# Patient Record
Sex: FEMALE | Race: WHITE | Employment: OTHER | URBAN - METROPOLITAN AREA
[De-identification: names, ages, dates, MRNs, and addresses within clinical notes are randomized per-mention and may not be internally consistent; named-entity substitution may affect disease eponyms.]

---

## 2018-04-05 ENCOUNTER — OFFICE VISIT (OUTPATIENT)
Dept: NEUROLOGY | Age: 63
End: 2018-04-05

## 2018-04-05 VITALS
DIASTOLIC BLOOD PRESSURE: 80 MMHG | SYSTOLIC BLOOD PRESSURE: 128 MMHG | BODY MASS INDEX: 27.23 KG/M2 | HEIGHT: 62 IN | OXYGEN SATURATION: 98 % | HEART RATE: 69 BPM | WEIGHT: 148 LBS

## 2018-04-05 DIAGNOSIS — E89.0 POSTABLATIVE HYPOTHYROIDISM: ICD-10-CM

## 2018-04-05 DIAGNOSIS — F32.1 CURRENT MODERATE EPISODE OF MAJOR DEPRESSIVE DISORDER WITHOUT PRIOR EPISODE (HCC): ICD-10-CM

## 2018-04-05 DIAGNOSIS — I10 ESSENTIAL HYPERTENSION: ICD-10-CM

## 2018-04-05 DIAGNOSIS — R41.3 MEMORY LOSS: Primary | ICD-10-CM

## 2018-04-05 DIAGNOSIS — R20.0 HAND NUMBNESS: ICD-10-CM

## 2018-04-05 RX ORDER — ESCITALOPRAM OXALATE 20 MG/1
20 TABLET ORAL DAILY
COMMUNITY
End: 2018-04-05 | Stop reason: ALTCHOICE

## 2018-04-05 RX ORDER — BISMUTH SUBSALICYLATE 262 MG
1 TABLET,CHEWABLE ORAL DAILY
COMMUNITY

## 2018-04-05 RX ORDER — OXYBUTYNIN CHLORIDE 15 MG/1
15 TABLET, EXTENDED RELEASE ORAL 2 TIMES DAILY
COMMUNITY
End: 2021-08-18

## 2018-04-05 RX ORDER — MELATONIN
2 TIMES DAILY
COMMUNITY

## 2018-04-05 RX ORDER — POTASSIUM CHLORIDE 20 MEQ/1
TABLET, EXTENDED RELEASE ORAL 2 TIMES DAILY
COMMUNITY

## 2018-04-05 RX ORDER — VENLAFAXINE HYDROCHLORIDE 37.5 MG/1
CAPSULE, EXTENDED RELEASE ORAL
Qty: 60 CAP | Refills: 5 | Status: SHIPPED | OUTPATIENT
Start: 2018-04-05 | End: 2018-06-08 | Stop reason: SDUPTHER

## 2018-04-05 NOTE — LETTER
4/5/2018 10:52 AM 
 
Patient:  Elisha Sullivan YOB: 1955 Date of Visit: 4/5/2018 Dear Scot Torres MD 
[facility] [address] [address] VIA Facsimile: [phone] 
 : Thank you for referring Ms. Elisha Sullivan to me for evaluation/treatment. Below are the relevant portions of my assessment and plan of care. HISTORY OF PRESENT ILLNESS Elisha Sullivan is a 58 y.o. female. HPI Comments: This patient is a 28-year-old right-handed  female who comes in today for memory problems. She is accompanied by a daughter. Her family has told her that she is having some memory problems. She states that her family tells her that she does not remember think she is told. She is employed full-time. She is doing reasonably well at her job. She has no problems driving to and from her job she has not had any complaints from the folks at her job about her performance. Her  is chronically ill and she believes that is considerable stressor for her. She has been treated for depression and is currently taking Lexapro 20 mg a day for the last 2 years. She does have a family history of depression. She also has hypertension, arthritis, and surgical hypothyroidism. She does have what she calls migraine headaches a few times a year and treats them with over-the-counter medications. Memory Loss The history is provided by the patient. This is a chronic problem. Review of Systems Constitutional:  
     Systems is positive for depression, fatigue, joint pain, memory loss, snoring. Complete review of systems done all others negative. Psychiatric/Behavioral: Positive for memory loss. Current Outpatient Prescriptions on File Prior to Visit Medication Sig Dispense Refill  indapamide (LOZOL) 2.5 mg tablet Take 2.5 mg by mouth daily.  levothyroxine (LEVOTHROID) 88 mcg tablet Take 88 mcg by mouth Daily (before breakfast).  diazepam (VALIUM) 5 mg tablet Take 1 Tab by mouth every eight (8) hours as needed (spasm). 15 Tab 0  
 traMADol (ULTRAM) 50 mg tablet Take 1 Tab by mouth every six (6) hours as needed for Pain. 20 Tab 0 No current facility-administered medications on file prior to visit. Past Medical History:  
Diagnosis Date  Arthritis  Endocrine disease  Headache  Hypertension  Memory disorder  Thyroid disease Family History Problem Relation Age of Onset  Cancer Mother  Dementia Mother  Migraines Mother  Kidney Disease Father Social History Substance Use Topics  Smoking status: Current Every Day Smoker Packs/day: 0.50  Smokeless tobacco: Never Used  Alcohol use No  
 
/80  Pulse 69  Ht 5' 2\" (1.575 m)  Wt 148 lb (67.1 kg)  SpO2 98%  BMI 27.07 kg/m2 Physical Exam  
MMSE 29/30 Constitutional: Oriented to person, place, and time, appears well-developed and well-nourished. No distress. HENT:  
Head: Normocephalic and atraumatic. Mouth/Throat: Oropharynx is clear and moist. No oropharyngeal exudate. Eyes: Conjunctivae and EOM are normal. Pupils are equal, round, and reactive to light. No scleral icterus. Neck: Normal range of motion. Neck supple. No thyromegaly present. Cardiovascular: Normal rate, regular rhythm and normal heart sounds. Musculoskeletal: Normal range of motion, exhibits no edema, tenderness or deformity. Lymphadenopathy: no cervical adenopathy. Neurological: Alert and oriented to person, place, and time. Normal strength and normal reflexes. Displays no atrophy and no tremor. No cranial nerve deficit or sensory deficit. Exhibits normal muscle tone. Displays a negative Romberg sign, no seizure activity. Coordination normal, gait normal.  
No Babinski's sign on the right side. No Babinski's sign on the left side.   
Speech, language and mentation are normal 
 Visual fields are full to confrontation, funduscopic exam reveals flat discs, the retina and vasculature are normal  
Skin: Skin is warm and dry. No rash noted, not diaphoretic. No erythema. Psychiatric: Normal mood and affect,  behavior is normal. Judgment and thought content normal.  
Vitals reviewed. ASSESSMENT and PLAN 
MEMORY LOSS I suspect this patient's memory loss is secondary to poorly controlled depression. She is working in a tough job and has a chronically ill . I suspect she has become resistant to the Lexapro she is taking. I am going to stop her Lexapro and put her on [facility] SR 7.5 mg a day for 2 weeks and then up to 75 mg a day. I have asked her to call us in 4 weeks and let us know how she is doing. I am going to set her up for an MRI scan of her brain out contrast.  I will rule out significant abnormalities that could cause memory loss. Again with an MMSE score of 29/30 I do not think were dealing with any disease besides depression here. Patient understands this and is agreeable with the treatment plan. HYPERTENSION Stroke risk, stable, managed by primary care DEPRESSION Not currently controlled, see above This note will not be viewable in [address] This note was created using voice recognition software. Despite editing, there may be syntax errors. If you have questions, please do not hesitate to call me. I look forward to following Ms. Janna Parker along with you. Sincerely, Ludin Pleitez MD

## 2018-04-05 NOTE — MR AVS SNAPSHOT
[facility] [address], 
[id number], [address] [facility] 
[phone] Patient: Kraig Santos MRN: [id number] VDH:4/1/0202 Visit Information Date & Time Provider Department Dept. Phone Encounter #  
 4/5/2018  9:00 AM Chuck Wild MD [facility] [phone] [id number] Your Appointments 10/4/2018  9:40 AM  
Follow Up with Chuck Wild MD  
Neurology [facility] at [facility] [address]) Appt Note: follow up memory loss $ 0 CP jll 4/5/18  
 [address] [place] [address], 
[address], [address] [address]  
[address] [facility], [address], [address] [address] Upcoming Health Maintenance Date Due Hepatitis C Screening 1955 Pneumococcal 19-64 Medium Risk (1 of 1 - PPSV23) 4/2/1973 DTaP/Tdap/Td series (1 - Tdap) 4/2/1975 PAP AKA CERVICAL CYTOLOGY 4/2/1975 BREAST CANCER SCRN MAMMOGRAM 4/2/2004 FOBT Q 1 YEAR AGE 50-75 4/2/2004 ZOSTER VACCINE AGE 60> 2/2/2014 Influenza Age 5 to Adult 8/1/2017 Allergies as of 4/5/2018  Review Complete On: 4/5/2018 By: Jack Bowling LPN Severity Noted Reaction Type Reactions Sulfa (Sulfonamide Antibiotics)  02/16/2014    Nausea Only Current Immunizations  Never Reviewed No immunizations on file. Not reviewed this visit You Were Diagnosed With   
  
 Codes Comments Memory loss    -  Primary ICD-10-CM: R41.3 ICD-9-CM: 780.93 Current moderate episode of major depressive disorder without prior episode ([facility] [address]     ICD-10-CM: F32.1 ICD-9-CM: 296.22 Essential hypertension     ICD-10-CM: I10 
ICD-9-CM: 401.9 Postablative hypothyroidism     ICD-10-CM: E89.0 ICD-9-CM: 244.1 Hand numbness     ICD-10-CM: R20.0 ICD-9-CM: 650. 0 Vitals BP Pulse Height(growth percentile) Weight(growth percentile) SpO2 BMI 128/80 69 5' 2\" (1.575 m) 148 lb (67.1 kg) 98% 27.07 kg/m2 Smoking Status Current Every Day Smoker Vitals History BMI and BSA Data Body Mass Index Body Surface Area  
 27.07 kg/m 2 1.71 m 2 Your Updated Medication List  
  
   
This list is accurate as of 18  9:42 AM.  Always use your most recent med list.  
  
  
  
  
 diazePAM 5 mg tablet Commonly known as:  VALIUM Take 1 Tab by mouth every eight (8) hours as needed (spasm). escitalopram oxalate 20 mg tablet Commonly known as:  Ayesha Angst Take 20 mg by mouth daily. indapamide 2.5 mg tablet Commonly known as:  Bañuelos Camron Take 2.5 mg by mouth daily. LEVOTHROID 88 mcg tablet Generic drug:  levothyroxine Take 88 mcg by mouth Daily (before breakfast). multivitamin tablet Commonly known as:  ONE A DAY Take 1 Tab by mouth daily. oxybutynin chloride XL 15 mg CR tablet Commonly known as:  DITROPAN XL Take 15 mg by mouth two (2) times a day. potassium chloride 20 mEq tablet Commonly known as:  K-DUR, KLOR-CON Take  by mouth two (2) times a day. traMADol 50 mg tablet Commonly known as:  ULTRAM  
Take 1 Tab by mouth every six (6) hours as needed for Pain. venlafaxine-SR 37.5 mg capsule Commonly known as:  EFFEXOR-XR  
1 p.o. nightly for 1 week then 2 p.o. nightly. Indications: major depressive disorder VITAMIN D3 1,000 unit tablet Generic drug:  cholecalciferol Take  by mouth two (2) times a day. Prescriptions Printed Refills  
 venlafaxine-SR (EFFEXOR-XR) 37.5 mg capsule 5 Si p.o. nightly for 1 week then 2 p.o. nightly. Indications: major depressive disorder Class: Print To-Do List   
 2018 Imaging:  MRI BRAIN WO CONT Patient Instructions PRESCRIPTION REFILL POLICY [facility] Statement to Patients 2014 In an effort to ensure the large volume of patient prescription refills is processed in the most efficient and expeditious manner, we are asking our patients to assist us by calling your Pharmacy for all prescription refills, this will include also your  Mail Order Pharmacy. The pharmacy will contact our office electronically to continue the refill process. Please do not wait until the last minute to call your pharmacy. We need at least 48 hours (2days) to fill prescriptions. We also encourage you to call your pharmacy before going to  your prescription to make sure it is ready. With regard to controlled substance prescription refill requests (narcotic refills) that need to be picked up at our office, we ask your cooperation by providing us with at least 72 hours (3days) notice that you will need a refill. We will not refill narcotic prescription refill requests after 4:00pm on any weekday, Monday through Thursday, or after 2:00pm on Fridays, or on the weekends. We encourage everyone to explore another way of getting your prescription refill request processed using [employer], our patient web portal through our electronic medical record system. [employer] is an efficient and effective way to communicate your medication request directly to the office and  downloadable as an abi on your smart phone . [employer] also features a review functionality that allows you to view your medication list as well as leave messages for your physician. Are you ready to get connected? If so please review the attatched instructions or speak to any of our staff to get you set up right away! Thank you so much for your cooperation. Should you have any questions please contact our Practice Administrator. The Physicians and Staff,  [facility] Introducing [facility]!    
 [facility] introduces [employer] patient portal. Now you can access parts of your medical record, email your doctor's office, and request medication refills online. 1. In your internet browser, go to [email or web address] 2. Click on the First Time User? Click Here link in the Sign In box. You will see the New Member Sign Up page. 3. Enter your [employer] Access Code exactly as it appears below. You will not need to use this code after youve completed the sign-up process. If you do not sign up before the expiration date, you must request a new code. · [employer] Access Code: [email or web address] Expires: 7/4/2018  8:33 AM 
 
4. Enter the last four digits of your Social Security Number (xxxx) and Date of Birth (mm/dd/yyyy) as indicated and click Submit. You will be taken to the next sign-up page. 5. Create a [employer] ID. This will be your [employer] login ID and cannot be changed, so think of one that is secure and easy to remember. 6. Create a [employer] password. You can change your password at any time. 7. Enter your Password Reset Question and Answer. This can be used at a later time if you forget your password. 8. Enter your e-mail address. You will receive e-mail notification when new information is available in [address] 9. Click Sign Up. You can now view and download portions of your medical record. 10. Click the Download Summary menu link to download a portable copy of your medical information. If you have questions, please visit the Frequently Asked Questions section of the [employer] website. Remember, [employer] is NOT to be used for urgent needs. For medical emergencies, dial 911. Now available from your iPhone and Android! Please provide this summary of care documentation to your next provider. Your primary care clinician is listed as Monika Biggs. If you have any questions after today's visit, please call [phone].

## 2018-04-05 NOTE — PROGRESS NOTES
HISTORY OF PRESENT ILLNESS  Megha Mittal is a 58 y.o. female. HPI Comments: This patient is a 59-year-old right-handed  female who comes in today for memory problems. She is accompanied by a daughter. Her family has told her that she is having some memory problems. She states that her family tells her that she does not remember think she is told. She is employed full-time. She is doing reasonably well at her job. She has no problems driving to and from her job she has not had any complaints from the folks at her job about her performance. Her  is chronically ill and she believes that is considerable stressor for her. She has been treated for depression and is currently taking Lexapro 20 mg a day for the last 2 years. She does have a family history of depression. She also has hypertension, arthritis, and surgical hypothyroidism. She does have what she calls migraine headaches a few times a year and treats them with over-the-counter medications. Memory Loss    The history is provided by the patient. This is a chronic problem. Review of Systems   Constitutional:        Systems is positive for depression, fatigue, joint pain, memory loss, snoring. Complete review of systems done all others negative. Psychiatric/Behavioral: Positive for memory loss. Current Outpatient Prescriptions on File Prior to Visit   Medication Sig Dispense Refill    indapamide (LOZOL) 2.5 mg tablet Take 2.5 mg by mouth daily.  levothyroxine (LEVOTHROID) 88 mcg tablet Take 88 mcg by mouth Daily (before breakfast).  diazepam (VALIUM) 5 mg tablet Take 1 Tab by mouth every eight (8) hours as needed (spasm). 15 Tab 0    traMADol (ULTRAM) 50 mg tablet Take 1 Tab by mouth every six (6) hours as needed for Pain. 20 Tab 0     No current facility-administered medications on file prior to visit.       Past Medical History:   Diagnosis Date    Arthritis     Endocrine disease     Headache     Hypertension     Memory disorder     Thyroid disease      Family History   Problem Relation Age of Onset    Cancer Mother     Dementia Mother     Migraines Mother     Kidney Disease Father      Social History   Substance Use Topics    Smoking status: Current Every Day Smoker     Packs/day: 0.50    Smokeless tobacco: Never Used    Alcohol use No     /80  Pulse 69  Ht 5' 2\" (1.575 m)  Wt 148 lb (67.1 kg)  SpO2 98%  BMI 27.07 kg/m2  Physical Exam   MMSE 29/30  Constitutional: Oriented to person, place, and time, appears well-developed and well-nourished. No distress. HENT:   Head: Normocephalic and atraumatic. Mouth/Throat: Oropharynx is clear and moist. No oropharyngeal exudate. Eyes: Conjunctivae and EOM are normal. Pupils are equal, round, and reactive to light. No scleral icterus. Neck: Normal range of motion. Neck supple. No thyromegaly present. Cardiovascular: Normal rate, regular rhythm and normal heart sounds. Musculoskeletal: Normal range of motion, exhibits no edema, tenderness or deformity. Lymphadenopathy: no cervical adenopathy. Neurological: Alert and oriented to person, place, and time. Normal strength and normal reflexes. Displays no atrophy and no tremor. No cranial nerve deficit or sensory deficit. Exhibits normal muscle tone. Displays a negative Romberg sign, no seizure activity. Coordination normal, gait normal.   No Babinski's sign on the right side. No Babinski's sign on the left side. Speech, language and mentation are normal  Visual fields are full to confrontation, funduscopic exam reveals flat discs, the retina and vasculature are normal   Skin: Skin is warm and dry. No rash noted, not diaphoretic. No erythema. Psychiatric: Normal mood and affect,  behavior is normal. Judgment and thought content normal.   Vitals reviewed. ASSESSMENT and PLAN  MEMORY LOSS  I suspect this patient's memory loss is secondary to poorly controlled depression. She is working in a tough job and has a chronically ill . I suspect she has become resistant to the Lexapro she is taking. I am going to stop her Lexapro and put her on [facility] SR 7.5 mg a day for 2 weeks and then up to 75 mg a day. I have asked her to call us in 4 weeks and let us know how she is doing. I am going to set her up for an MRI scan of her brain out contrast.  I will rule out significant abnormalities that could cause memory loss. Again with an MMSE score of 29/30 I do not think were dealing with any disease besides depression here. Patient understands this and is agreeable with the treatment plan. HYPERTENSION  Stroke risk, stable, managed by primary care  DEPRESSION  Not currently controlled, see above  This note will not be viewable in [employer]. This note was created using voice recognition software. Despite editing, there may be syntax errors.

## 2018-04-17 ENCOUNTER — TELEPHONE (OUTPATIENT)
Dept: NEUROLOGY | Age: 63
End: 2018-04-17

## 2018-04-17 DIAGNOSIS — R41.3 MEMORY LOSS: Primary | ICD-10-CM

## 2018-04-17 DIAGNOSIS — F32.A DEPRESSION, UNSPECIFIED DEPRESSION TYPE: ICD-10-CM

## 2018-04-17 NOTE — TELEPHONE ENCOUNTER
Sara All  Female, 58 y.o., 1955  Weight:   148 lb (67.1 kg)  Phone:   L:[phone]  PCP:   Martin Boggs MD  MRN:   [id number]  MyChart:   Pending  Next Appt (With Me)  None  Next Appt (Any Provider)  10/04/2018     Denial  Received: Today       Daryl Mujica LPN       Phone Number: [phone]                     Luis Olson- I wanted to let you know that Ms. Rosenbaum's MRI of the brain has been denied. Sim Pittman will be contacting the patient this morning to let them know that the appointment will be canceled. Here is the information from the insurance co. on the reason for denial.     Authorization Denied // [facility]#[id number] FOR CPT 38120 Based on [facility]   Imaging Guidelines, we are unable to approve the requested study. Guidelines   support advanced imaging after there has been a clinical diagnosis of dementia. A diagnosis can be made by a detailed history documenting memory loss and   cognitive impairment affecting daily activities, and/or an abnormal mental   status exam such as the Mini Mental Status Exam, Demarcus Cognitive Assessment,   Memory Impairment Screen, or other neuropsychological testing.  The clinical   information provided does not meet these criteria and, therefore, advanced   imaging is not indicated at this time.     Magda Bell

## 2018-05-24 ENCOUNTER — OFFICE VISIT (OUTPATIENT)
Dept: NEUROLOGY | Age: 63
End: 2018-05-24

## 2018-05-24 VITALS
HEIGHT: 62 IN | DIASTOLIC BLOOD PRESSURE: 70 MMHG | OXYGEN SATURATION: 98 % | BODY MASS INDEX: 26.31 KG/M2 | WEIGHT: 143 LBS | HEART RATE: 76 BPM | SYSTOLIC BLOOD PRESSURE: 128 MMHG

## 2018-05-24 DIAGNOSIS — R41.3 MEMORY LOSS: ICD-10-CM

## 2018-05-24 DIAGNOSIS — R29.2 HYPERREFLEXIA: Primary | ICD-10-CM

## 2018-05-24 DIAGNOSIS — R55 SYNCOPE, UNSPECIFIED SYNCOPE TYPE: ICD-10-CM

## 2018-05-24 DIAGNOSIS — F68.8 PERSONALITY CHANGE: ICD-10-CM
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
## 2018-05-24 NOTE — PATIENT INSTRUCTIONS
Office Policies  o Phone calls/patient messages:  Please allow up to 24 hours for someone in the office to contact you about your call or message. Be mindful your provider may be out of the office or your message may require further review. We encourage you to use [employer] for your messages as this is a faster, more efficient way to communicate with our office  o Medication Refills:  Prescription medications require up to 48 business hours to process. We encourage you to use [employer] for your refills. For controlled medications: Please allow up to 72 business hours to process. Certain medications may require you to  a written prescription at our office. NO narcotic/controlled medications will be prescribed after 4pm Monday through Friday or on weekends  o Form/Paperwork Completion:  Please note there is a $25 fee for all paperwork completed by our providers. We ask that you allow 7-14 business days. Pre-payment is due prior to picking up/faxing the completed form. You may also download your forms to [employer] to have your doctor print off.  o Test Results: In order for a patient to obtain test results, an appointment must be made with the physician to review the results. Test results cannot be discussed over the phone.

## 2018-05-25 ENCOUNTER — DOCUMENTATION ONLY (OUTPATIENT)
Dept: NEUROLOGY | Age: 63
End: 2018-05-25

## 2018-06-07 ENCOUNTER — HOSPITAL ENCOUNTER (OUTPATIENT)
Dept: MRI IMAGING | Age: 63
Discharge: HOME | End: 2018-06-07
Attending: PSYCHIATRY & NEUROLOGY
Payer: COMMERCIAL

## 2018-06-07 DIAGNOSIS — F68.8 PERSONALITY CHANGE: ICD-10-CM

## 2018-06-07 DIAGNOSIS — R29.2 HYPERREFLEXIA: ICD-10-CM

## 2018-06-07 PROCEDURE — 72141 MRI NECK SPINE W/O DYE: CPT

## 2018-06-07 PROCEDURE — 70551 MRI BRAIN STEM W/O DYE: CPT

## 2018-06-08 ENCOUNTER — HOSPITAL ENCOUNTER (OUTPATIENT)
Dept: NEUROLOGY | Age: 63
Discharge: HOME | End: 2018-06-08
Attending: PSYCHIATRY & NEUROLOGY

## 2018-06-08 DIAGNOSIS — F68.8 PERSONALITY CHANGE: ICD-10-CM

## 2018-06-08 DIAGNOSIS — R29.2 HYPERREFLEXIA: ICD-10-CM

## 2018-06-08 DIAGNOSIS — R55 SYNCOPE, UNSPECIFIED SYNCOPE TYPE: ICD-10-CM

## 2018-06-08 RX ORDER — VENLAFAXINE HYDROCHLORIDE 37.5 MG/1
CAPSULE, EXTENDED RELEASE ORAL
Qty: 180 CAP | Refills: 3 | Status: SHIPPED | OUTPATIENT
Start: 2018-06-08 | End: 2019-12-19 | Stop reason: CLARIF

## 2018-06-08 NOTE — PROCEDURES
Patient Name: Va Christianson  : 1955  Age: 58 y.o. Ordering physician: Zulema Salmon  Date of EE18   EEG procedure number: [id number]  Diagnosis: Memory loss  Interpreting physician: Gen Cleveland MD      ELECTROENCEPHALOGRAM REPORT     PROCEDURE: EEG. CLINICAL INDICATION: The patient is a 58 y.o. female who is being evaluated for baseline electro cerebral activities and to rule out seizure focus. EEG CLASSIFICATION: Normal    DESCRIPTION OF THE RECORD:   The background of this recording contains a posteriorly-located occipital alpha rhythm of 10 Hz that attenuates with eye opening. Throughout the recording, there were no clear areas of focal slowing nor spike or spike-and-wave discharges seen. Hyperventilation was not performed. Photic stimulation produced no response in the posterior head regions. During the recording the patient did not achieve stage II sleep    INTERPRETATION: This is a normal electroencephalogram with the patient awake, showing no clear focal abnormalities or epileptiform activity. A normal EEG doesn't not rule out seizures. Clinical correlation recommended.       Gen Cleveland MD  Neurologist

## 2018-06-08 NOTE — TELEPHONE ENCOUNTER
Received refill for 90 day supply from [facility] for   Last filled 4/5/18  Dr. Lilia Waddell refill

## 2018-06-19 ENCOUNTER — TELEPHONE (OUTPATIENT)
Dept: NEUROLOGY | Age: 63
End: 2018-06-19

## 2018-06-20 ENCOUNTER — DOCUMENTATION ONLY (OUTPATIENT)
Dept: NEUROLOGY | Age: 63
End: 2018-06-20

## 2018-06-26 ENCOUNTER — OFFICE VISIT (OUTPATIENT)
Dept: CARDIOLOGY CLINIC | Age: 63
End: 2018-06-26

## 2018-06-26 VITALS
BODY MASS INDEX: 25.27 KG/M2 | OXYGEN SATURATION: 97 % | HEIGHT: 62 IN | WEIGHT: 137.3 LBS | RESPIRATION RATE: 16 BRPM | DIASTOLIC BLOOD PRESSURE: 70 MMHG | HEART RATE: 70 BPM | SYSTOLIC BLOOD PRESSURE: 120 MMHG

## 2018-06-26 DIAGNOSIS — R55 SYNCOPE AND COLLAPSE: Primary | ICD-10-CM

## 2018-06-26 DIAGNOSIS — Z76.89 ENCOUNTER TO ESTABLISH CARE: ICD-10-CM

## 2018-06-26 DIAGNOSIS — E03.9 HYPOTHYROIDISM, UNSPECIFIED TYPE: ICD-10-CM

## 2018-06-26 DIAGNOSIS — I10 ESSENTIAL HYPERTENSION: ICD-10-CM

## 2018-06-26 DIAGNOSIS — Z82.49 FAMILY HISTORY OF ISCHEMIC HEART DISEASE AND OTHER DISEASES OF THE CIRCULATORY SYSTEM: ICD-10-CM

## 2018-06-26 RX ORDER — ZINC GLUCONATE 50 MG
1000 TABLET ORAL DAILY
COMMUNITY
End: 2022-08-11

## 2018-06-26 RX ORDER — ESCITALOPRAM OXALATE 20 MG/1
20 TABLET ORAL DAILY
COMMUNITY
Start: 2018-06-06 | End: 2018-10-04 | Stop reason: ALTCHOICE
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
## 2018-06-27 ENCOUNTER — TELEPHONE (OUTPATIENT)
Dept: CARDIOLOGY CLINIC | Age: 63
End: 2018-06-27

## 2018-06-27 NOTE — TELEPHONE ENCOUNTER
CARRIE Duff Crimes, LPN                     Pls obtain records from [facility]. Especially echo       Faxed request to [facility] medical records as requested.

## 2018-07-19 ENCOUNTER — OFFICE VISIT (OUTPATIENT)
Dept: NEUROLOGY | Age: 63
End: 2018-07-19

## 2018-07-19 ENCOUNTER — DOCUMENTATION ONLY (OUTPATIENT)
Dept: NEUROLOGY | Age: 63
End: 2018-07-19

## 2018-07-19 VITALS
HEIGHT: 62 IN | WEIGHT: 139.4 LBS | BODY MASS INDEX: 25.65 KG/M2 | SYSTOLIC BLOOD PRESSURE: 130 MMHG | HEART RATE: 70 BPM | DIASTOLIC BLOOD PRESSURE: 72 MMHG | OXYGEN SATURATION: 91 %

## 2018-07-19 DIAGNOSIS — R41.3 MEMORY LOSS: Primary | ICD-10-CM

## 2018-07-19 DIAGNOSIS — F32.9 REACTIVE DEPRESSION: ICD-10-CM

## 2018-07-19 NOTE — MR AVS SNAPSHOT
[address], 
[place], [address] [facility] 
[phone] Patient: Mily Lou MRN: [id number] TGC:1/1/0379 Visit Information Date & Time Provider Department Dept. Phone Encounter #  
 7/19/2018  3:20 PM Leda Camarena MD Neurology Clinic at [facility] [phone] [id number] Your Appointments 7/31/2018  8:30 AM  
NUCLEAR MEDICINE with NUCLEAR, [facility] Appt Note: Dr Tabitha Muñoz (Order [id number]) 5'2\" 137pds,jar  
 [address] [facility]  
[phone] [address] [facility]  
  
    
 7/31/2018 11:30 AM  
PROCEDURE with [facility] Appt Note: Dr. Silverio Tapia (Order [id number]) ,Nordre Banegate 103 [facility]  
[phone] [address] [facility]  
  
    
 10/4/2018  9:40 AM  
Follow Up with Leda Camarena MD  
Neurology Clinic at [facility]) Appt Note: follow up memory loss $ 0 CP jll 4/5/18  
 [address] [place] [address], 
[address] [place] [address], [address] [address]  
[address], [address] [place] [address] [address] [address] Upcoming Health Maintenance Date Due Hepatitis C Screening 1955 Pneumococcal 19-64 Medium Risk (1 of 1 - PPSV23) 4/2/1973 DTaP/Tdap/Td series (1 - Tdap) 4/2/1975 PAP AKA CERVICAL CYTOLOGY 4/2/1975 BREAST CANCER SCRN MAMMOGRAM 4/2/2004 FOBT Q 1 YEAR AGE 50-75 4/2/2004 ZOSTER VACCINE AGE 60> 2/2/2014 Influenza Age 5 to Adult 8/1/2018 Allergies as of 7/19/2018  Review Complete On: 7/19/2018 By: Leda Camarena MD  
  
 Severity Noted Reaction Type Reactions Sulfa (Sulfonamide Antibiotics)  02/16/2014    Nausea Only Current Immunizations  Never Reviewed No immunizations on file. Not reviewed this visit You Were Diagnosed With   
  
 Codes Comments Memory loss    -  Primary ICD-10-CM: R41.3 ICD-9-CM: 780.93 Reactive depression     ICD-10-CM: F32.9 ICD-9-CM: 300.4 Vitals BP Pulse Height(growth percentile) Weight(growth percentile) SpO2 BMI  
 130/72 70 5' 2\" (1.575 m) 139 lb 6.4 oz (63.2 kg) 91% 25.5 kg/m2 Smoking Status Current Every Day Smoker BMI and BSA Data Body Mass Index Body Surface Area 25.5 kg/m 2 1.66 m 2 Preferred Pharmacy Pharmacy Name Phone [facility], [address] [phone] Your Updated Medication List  
  
   
This list is accurate as of 7/19/18  4:09 PM.  Always use your most recent med list.  
  
  
  
  
 escitalopram oxalate 20 mg tablet Commonly known as:  Jackinn Ramp Take 20 mg by mouth daily. indapamide 2.5 mg tablet Commonly known as:  Thor Millet Take 2.5 mg by mouth daily. LEVOTHROID 88 mcg tablet Generic drug:  levothyroxine Take 88 mcg by mouth Daily (before breakfast). multivitamin tablet Commonly known as:  ONE A DAY Take 1 Tab by mouth daily. oxybutynin chloride XL 15 mg CR tablet Commonly known as:  DITROPAN XL Take 15 mg by mouth two (2) times a day. potassium chloride 20 mEq tablet Commonly known as:  K-DUR, KLOR-CON Take  by mouth two (2) times a day. venlafaxine-SR 37.5 mg capsule Commonly known as:  EFFEXOR-XR Take  2 p.o. nightly. Indications: major depressive disorder VITAMIN B-12 1,000 mcg tablet Generic drug:  cyanocobalamin Take 1,000 mcg by mouth daily. VITAMIN D3 1,000 unit tablet Generic drug:  cholecalciferol Take  by mouth two (2) times a day. Patient Instructions A Healthy Lifestyle: Care Instructions Your Care Instructions A healthy lifestyle can help you feel good, stay at a healthy weight, and have plenty of energy for both work and play. A healthy lifestyle is something you can share with your whole family. A healthy lifestyle also can lower your risk for serious health problems, such as high blood pressure, heart disease, and diabetes. You can follow a few steps listed below to improve your health and the health of your family. Follow-up care is a key part of your treatment and safety. Be sure to make and go to all appointments, and call your doctor if you are having problems. It's also a good idea to know your test results and keep a list of the medicines you take. How can you care for yourself at home? · Do not eat too much sugar, fat, or fast foods. You can still have dessert and treats now and then. The goal is moderation. · Start small to improve your eating habits. Pay attention to portion sizes, drink less juice and soda pop, and eat more fruits and vegetables. ¨ Eat a healthy amount of food. A 3-ounce serving of meat, for example, is about the size of a deck of cards. Fill the rest of your plate with vegetables and whole grains. ¨ Limit the amount of soda and sports drinks you have every day. Drink more water when you are thirsty. ¨ Eat at least 5 servings of fruits and vegetables every day. It may seem like a lot, but it is not hard to reach this goal. A serving or helping is 1 piece of fruit, 1 cup of vegetables, or 2 cups of leafy, raw vegetables. Have an apple or some carrot sticks as an afternoon snack instead of a candy bar. Try to have fruits and/or vegetables at every meal. 
· Make exercise part of your daily routine. You may want to start with simple activities, such as walking, bicycling, or slow swimming. Try to be active 30 to 60 minutes every day.  You do not need to do all 30 to 60 minutes all at once. For example, you can exercise 3 times a day for 10 or 20 minutes. Moderate exercise is safe for most people, but it is always a good idea to talk to your doctor before starting an exercise program. 
· Keep moving. Claudia Rodriguez the lawn, work in the garden, or [place]. Take the stairs instead of the elevator at work. · If you smoke, quit. People who smoke have an increased risk for heart attack, stroke, cancer, and other lung illnesses. Quitting is hard, but there are ways to boost your chance of quitting tobacco for good. ¨ Use nicotine gum, patches, or lozenges. ¨ Ask your doctor about stop-smoking programs and medicines. ¨ Keep trying. In addition to reducing your risk of diseases in the future, you will notice some benefits soon after you stop using tobacco. If you have shortness of breath or asthma symptoms, they will likely get better within a few weeks after you quit. · Limit how much alcohol you drink. Moderate amounts of alcohol (up to 2 drinks a day for men, 1 drink a day for women) are okay. But drinking too much can lead to liver problems, high blood pressure, and other health problems. Family health If you have a family, there are many things you can do together to improve your health. · Eat meals together as a family as often as possible. · Eat healthy foods. This includes fruits, vegetables, lean meats and dairy, and whole grains. · Include your family in your fitness plan. Most people think of activities such as jogging or tennis as the way to fitness, but there are many ways you and your family can be more active. Anything that makes you breathe hard and gets your heart pumping is exercise. Here are some tips: 
¨ Walk to do errands or to take your child to school or the bus. ¨ Go for a family bike ride after dinner instead of watching TV. Where can you learn more? Go to http://jeannine-krys.info/. Enter F478 in the search box to learn more about \"A Healthy Lifestyle: Care Instructions. \" Current as of: December 7, 2017 Content Version: 11.7 © 7795-2981 myDocket. Care instructions adapted under license by [employer] (which disclaims liability or warranty for this information). If you have questions about a medical condition or this instruction, always ask your healthcare professional. [address] any warranty or liability for your use of this information. Please be advised there is a $25 fee for all paperwork to be completed from our  providers. This is to be paid by the patient prior to picking up the completed forms. Patient Instructions History Introducing [facility] & [facility]! [facility] introduces [employer] patient portal. Now you can access parts of your medical record, email your doctor's office, and request medication refills online. 1. In your internet browser, go to [email or web address] 2. Click on the First Time User? Click Here link in the Sign In box. You will see the New Member Sign Up page. 3. Enter your [employer] Access Code exactly as it appears below. You will not need to use this code after youve completed the sign-up process. If you do not sign up before the expiration date, you must request a new code. · [employer] Access Code: [email or web address] Expires: 10/7/2018  2:58 PM 
 
4. Enter the last four digits of your Social Security Number (xxxx) and Date of Birth (mm/dd/yyyy) as indicated and click Submit. You will be taken to the next sign-up page. 5. Create a Oparat ID. This will be your [employer] login ID and cannot be changed, so think of one that is secure and easy to remember. 6. Create a [employer] password. You can change your password at any time. 7. Enter your Password Reset Question and Answer. This can be used at a later time if you forget your password. 8. Enter your e-mail address. You will receive e-mail notification when new information is available in [address] 9. Click Sign Up. You can now view and download portions of your medical record. 10. Click the Download Summary menu link to download a portable copy of your medical information. If you have questions, please visit the Frequently Asked Questions section of the [employer] website. Remember, [employer] is NOT to be used for urgent needs. For medical emergencies, dial 911. Now available from your iPhone and Android! Please provide this summary of care documentation to your next provider. Your primary care clinician is listed as Bushra Rashid. If you have any questions after today's visit, please call [phone].
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
## 2018-07-19 NOTE — PATIENT INSTRUCTIONS
A Healthy Lifestyle: Care Instructions Your Care Instructions A healthy lifestyle can help you feel good, stay at a healthy weight, and have plenty of energy for both work and play. A healthy lifestyle is something you can share with your whole family. A healthy lifestyle also can lower your risk for serious health problems, such as high blood pressure, heart disease, and diabetes. You can follow a few steps listed below to improve your health and the health of your family. Follow-up care is a key part of your treatment and safety. Be sure to make and go to all appointments, and call your doctor if you are having problems. It's also a good idea to know your test results and keep a list of the medicines you take. How can you care for yourself at home? · Do not eat too much sugar, fat, or fast foods. You can still have dessert and treats now and then. The goal is moderation. · Start small to improve your eating habits. Pay attention to portion sizes, drink less juice and soda pop, and eat more fruits and vegetables. ¨ Eat a healthy amount of food. A 3-ounce serving of meat, for example, is about the size of a deck of cards. Fill the rest of your plate with vegetables and whole grains. ¨ Limit the amount of soda and sports drinks you have every day. Drink more water when you are thirsty. ¨ Eat at least 5 servings of fruits and vegetables every day. It may seem like a lot, but it is not hard to reach this goal. A serving or helping is 1 piece of fruit, 1 cup of vegetables, or 2 cups of leafy, raw vegetables. Have an apple or some carrot sticks as an afternoon snack instead of a candy bar. Try to have fruits and/or vegetables at every meal. 
· Make exercise part of your daily routine. You may want to start with simple activities, such as walking, bicycling, or slow swimming. Try to be active 30 to 60 minutes every day. You do not need to do all 30 to 60 minutes all at once.  For example, you can exercise 3 times a day for 10 or 20 minutes. Moderate exercise is safe for most people, but it is always a good idea to talk to your doctor before starting an exercise program. 
· Keep moving. Lyn Valerio the lawn, work in the garden, or [employer]. Take the stairs instead of the elevator at work. · If you smoke, quit. People who smoke have an increased risk for heart attack, stroke, cancer, and other lung illnesses. Quitting is hard, but there are ways to boost your chance of quitting tobacco for good. ¨ Use nicotine gum, patches, or lozenges. ¨ Ask your doctor about stop-smoking programs and medicines. ¨ Keep trying. In addition to reducing your risk of diseases in the future, you will notice some benefits soon after you stop using tobacco. If you have shortness of breath or asthma symptoms, they will likely get better within a few weeks after you quit. · Limit how much alcohol you drink. Moderate amounts of alcohol (up to 2 drinks a day for men, 1 drink a day for women) are okay. But drinking too much can lead to liver problems, high blood pressure, and other health problems. Family health If you have a family, there are many things you can do together to improve your health. · Eat meals together as a family as often as possible. · Eat healthy foods. This includes fruits, vegetables, lean meats and dairy, and whole grains. · Include your family in your fitness plan. Most people think of activities such as jogging or tennis as the way to fitness, but there are many ways you and your family can be more active. Anything that makes you breathe hard and gets your heart pumping is exercise. Here are some tips: 
¨ Walk to do errands or to take your child to school or the bus. ¨ Go for a family bike ride after dinner instead of watching TV. Where can you learn more? Go to http://jeannine-krys.info/. Enter A309 in the search box to learn more about \"A Healthy Lifestyle: Care Instructions. \" Current as of: December 7, 2017 Content Version: 11.7 © 2091-1451 [employer]. Care instructions adapted under license by [employer] (which disclaims liability or warranty for this information). If you have questions about a medical condition or this instruction, always ask your healthcare professional. [address] any warranty or liability for your use of this information. Please be advised there is a $25 fee for all paperwork to be completed from our  providers. This is to be paid by the patient prior to picking up the completed forms.

## 2018-07-26 ENCOUNTER — DOCUMENTATION ONLY (OUTPATIENT)
Dept: CARDIOLOGY CLINIC | Age: 63
End: 2018-07-26

## 2018-07-31 ENCOUNTER — CLINICAL SUPPORT (OUTPATIENT)
Dept: CARDIOLOGY CLINIC | Age: 63
End: 2018-07-31

## 2018-07-31 DIAGNOSIS — Z82.49 FAMILY HISTORY OF ISCHEMIC HEART DISEASE AND OTHER DISEASES OF THE CIRCULATORY SYSTEM: ICD-10-CM

## 2018-07-31 DIAGNOSIS — I10 ESSENTIAL HYPERTENSION: ICD-10-CM

## 2018-07-31 DIAGNOSIS — E03.9 HYPOTHYROIDISM, UNSPECIFIED TYPE: ICD-10-CM

## 2018-07-31 DIAGNOSIS — Z76.89 ENCOUNTER TO ESTABLISH CARE: ICD-10-CM

## 2018-07-31 DIAGNOSIS — R55 SYNCOPE AND COLLAPSE: ICD-10-CM

## 2018-08-06 ENCOUNTER — TELEPHONE (OUTPATIENT)
Dept: CARDIOLOGY CLINIC | Age: 63
End: 2018-08-06

## 2018-08-06 NOTE — TELEPHONE ENCOUNTER
----- Message from Gregorio Gutierrez MD sent at 8/2/2018 12:29 PM EDT -----  Inform her stress test is k      Called pt,verified pt with two pt identifiers, told pt her stress test is normal. Pt verbalized understanding.

## 2018-08-23 ENCOUNTER — TELEPHONE (OUTPATIENT)
Dept: CARDIOLOGY CLINIC | Age: 63
End: 2018-08-23

## 2018-08-23 NOTE — TELEPHONE ENCOUNTER
Spoke with patient. Verified patient with two patient identifiers. Advised monitor normal.  Patient verbalized understanding.

## 2018-08-23 NOTE — TELEPHONE ENCOUNTER
----- Message from Patricio Han MD sent at 8/22/2018  8:50 AM EDT -----  Inform her monitor is normal.

## 2018-10-04 ENCOUNTER — OFFICE VISIT (OUTPATIENT)
Dept: NEUROLOGY | Age: 63
End: 2018-10-04

## 2018-10-04 VITALS
SYSTOLIC BLOOD PRESSURE: 128 MMHG | HEART RATE: 75 BPM | WEIGHT: 142 LBS | DIASTOLIC BLOOD PRESSURE: 70 MMHG | OXYGEN SATURATION: 98 % | BODY MASS INDEX: 26.13 KG/M2 | HEIGHT: 62 IN

## 2018-10-04 DIAGNOSIS — R41.3 MEMORY LOSS: Primary | ICD-10-CM

## 2018-10-04 DIAGNOSIS — F32.4 MAJOR DEPRESSIVE DISORDER WITH SINGLE EPISODE, IN PARTIAL REMISSION (HCC): ICD-10-CM

## 2018-10-04 PROBLEM — F32.9 REACTIVE DEPRESSION: Status: ACTIVE | Noted: 2018-10-04

## 2018-10-04 RX ORDER — VENLAFAXINE HYDROCHLORIDE 75 MG/1
CAPSULE, EXTENDED RELEASE ORAL
Qty: 180 CAP | Refills: 3 | Status: SHIPPED | OUTPATIENT
Start: 2018-10-04 | End: 2019-12-19 | Stop reason: SDUPTHER

## 2018-10-04 NOTE — PROGRESS NOTES
HISTORY OF PRESENT ILLNESS Annabel Anand is a 58 y.o. female. HPI Comments: This patient returns in follow-up. I have been seeing her for memory loss. Her memory is back to normal.  She was having some memory loss that was related to depression. She is still under pressure from her . He has significant chronic disease and is quite unpleasant to her. She has thought about leaving him but then there would be no one to take care of him. She is no longer taking the Lexapro that she was taking, she is taking venlafaxine 75 mg nightly. She has no new complaints. Review of Systems Constitutional:  
     Systems is positive for depression, fatigue, joint pain, memory loss, snoring. Complete review of systems done all others negative. Psychiatric/Behavioral: Positive for memory loss. Current Outpatient Prescriptions on File Prior to Visit Medication Sig Dispense Refill  cyanocobalamin (VITAMIN B-12) 1,000 mcg tablet Take 1,000 mcg by mouth daily.  venlafaxine-SR (EFFEXOR-XR) 37.5 mg capsule Take  2 p.o. nightly. Indications: major depressive disorder 180 Cap 3  potassium chloride (K-DUR, KLOR-CON) 20 mEq tablet Take  by mouth two (2) times a day.  multivitamin (ONE A DAY) tablet Take 1 Tab by mouth daily.  cholecalciferol (VITAMIN D3) 1,000 unit tablet Take  by mouth two (2) times a day.  oxybutynin chloride XL (DITROPAN XL) 15 mg CR tablet Take 15 mg by mouth two (2) times a day.  indapamide (LOZOL) 2.5 mg tablet Take 2.5 mg by mouth daily.  levothyroxine (LEVOTHROID) 88 mcg tablet Take 88 mcg by mouth Daily (before breakfast). No current facility-administered medications on file prior to visit. Past Medical History:  
Diagnosis Date  Arthritis  Endocrine disease  Headache  Hypertension  Memory disorder  Thyroid disease Family History Problem Relation Age of Onset  Cancer Mother  Dementia Mother  Migraines Mother  Kidney Disease Father Social History Substance Use Topics  Smoking status: Current Every Day Smoker Packs/day: 0.50  Smokeless tobacco: Never Used  Alcohol use No  
 
/70  Pulse 75  Ht 5' 2\" (1.575 m)  Wt 64.4 kg (142 lb)  SpO2 98%  BMI 25.97 kg/m2 Physical Exam  
This patient appears somewhat depressed however I do not think she is any more depressed than when I last saw her. She is in a tough situation and I think a fair amount of this depression is situational. 
Constitutional: Oriented to person, place, and time, appears well-developed and well-nourished. No distress. Neurological: Alert and oriented to person, place, and time. Displays no atrophy and no tremor. Gait and station are normal 
 
Vitals reviewed. ASSESSMENT and PLAN 
HYPERREFLEXIA Unexplained but unchanged MEMORY LOSS Her MMSE today was 30 out of 30. HYPERTENSION Stroke risk, stable, managed by primary care DEPRESSION He is doing better on the venlafaxine, I am going to put her up to 150 mg a day, 75 mg in the morning and 75 mg at night. He actually requested this. Also going to see if we can find her a counselor to work through some of his problems with as she is between a rock and a hard place. Plan to see her back in 4 months I spent 40 minutes with this patient and her daughter, over half the time was spent on counseling and coordination of care. This note will not be viewable in [address]

## 2018-10-04 NOTE — MR AVS SNAPSHOT
[address] [place] [address], 
[id number], [address] [facility] 
[phone] Patient: Reuben Bryant MRN: [id number] SKS:[id number][id number] Visit Information Date & Time Provider Department Dept. Phone Encounter #  
 10/4/2018  9:40 AM Derrick Reeder MD [facility] [phone] [id number] Follow-up Instructions Return in about 4 months (around 2/4/2019). Your Appointments 2/7/2019 10:20 AM  
Follow Up with Derrick Reeder MD  
[facility] [address] [facility]) Appt Note: follow up memory loss $ 0 CP jll 10/4/18  
 [address] [facility], 
[address] [place] [address], [address] [phone]  
[address] [facility], [address] [place] [address], [address] [place] [address] [phone] Upcoming Health Maintenance Date Due Hepatitis C Screening 1955 Pneumococcal 19-64 Medium Risk (1 of 1 - PPSV23) 4/2/1973 DTaP/Tdap/Td series (1 - Tdap) 4/2/1975 PAP AKA CERVICAL CYTOLOGY 4/2/1975 Shingrix Vaccine Age 50> (1 of 2) 4/2/2004 BREAST CANCER SCRN MAMMOGRAM 4/2/2004 FOBT Q 1 YEAR AGE 50-75 4/2/2004 Influenza Age 5 to Adult 8/1/2018 Allergies as of 10/4/2018  Review Complete On: 10/4/2018 By: Derrick Reeder MD  
  
 Severity Noted Reaction Type Reactions Sulfa (Sulfonamide Antibiotics)  02/16/2014    Nausea Only Current Immunizations  Never Reviewed No immunizations on file. Not reviewed this visit You Were Diagnosed With   
  
 Codes Comments Memory loss    -  Primary ICD-10-CM: R41.3 ICD-9-CM: 780.93 Major depressive disorder with single episode, in partial remission ([facility]     ICD-10-CM: F32.4 ICD-9-CM: 296.25 Vitals BP Pulse Height(growth percentile) Weight(growth percentile) SpO2 BMI  
 128/70 75 5' 2\" (1.575 m) 142 lb (64.4 kg) 98% 25.97 kg/m2 Smoking Status Current Every Day Smoker Vitals History BMI and BSA Data Body Mass Index Body Surface Area  
 25.97 kg/m 2 1.68 m 2 Preferred Pharmacy Pharmacy Name Phone [address], [address] [phone] Your Updated Medication List  
  
   
This list is accurate as of 10/4/18 10:03 AM.  Always use your most recent med list.  
  
  
  
  
 indapamide 2.5 mg tablet Commonly known as:  Ninfa Saltness Take 2.5 mg by mouth daily. LEVOTHROID 88 mcg tablet Generic drug:  levothyroxine Take 88 mcg by mouth Daily (before breakfast). multivitamin tablet Commonly known as:  ONE A DAY Take 1 Tab by mouth daily. oxybutynin chloride XL 15 mg CR tablet Commonly known as:  DITROPAN XL Take 15 mg by mouth two (2) times a day. potassium chloride 20 mEq tablet Commonly known as:  K-DUR, KLOR-CON Take  by mouth two (2) times a day. * venlafaxine-SR 37.5 mg capsule Commonly known as:  EFFEXOR-XR Take  2 p.o. nightly. Indications: major depressive disorder * venlafaxine-SR 75 mg capsule Commonly known as:  EFFEXOR-XR  
1 po bid  Indications: ANXIETY WITH DEPRESSION  
  
 VITAMIN B-12 1,000 mcg tablet Generic drug:  cyanocobalamin Take 1,000 mcg by mouth daily. VITAMIN D3 1,000 unit tablet Generic drug:  cholecalciferol Take  by mouth two (2) times a day. * Notice: This list has 2 medication(s) that are the same as other medications prescribed for you. Read the directions carefully, and ask your doctor or other care provider to review them with you. Prescriptions Sent to Pharmacy Refills  
 venlafaxine-SR (EFFEXOR-XR) 75 mg capsule 3 Si po bid  Indications: ANXIETY WITH DEPRESSION Class: Normal  
 Pharmacy: [facility] [address], [address]  #: [phone] Follow-up Instructions Return in about 4 months (around 2019). Patient Instructions Office Policies 
o Phone calls/patient messages: Please allow up to 24 hours for someone in the office to contact you about your call or message. Be mindful your provider may be out of the office or your message may require further review. We encourage you to use Carista App for your messages as this is a faster, more efficient way to communicate with our office 
o Medication Refills: 
Prescription medications require up to 48 business hours to process. We encourage you to use Carista App for your refills. For controlled medications: Please allow up to 72 business hours to process. Certain medications may require you to  a written prescription at our office. NO narcotic/controlled medications will be prescribed after 4pm Monday through Friday or on weekends 
o Form/Paperwork Completion: 
Please note there is a $25 fee for all paperwork completed by our providers. We ask that you allow 7-14 business days. Pre-payment is due prior to picking up/faxing the completed form. You may also download your forms to Carista App to have your doctor print off. 
o Test Results: In order for a patient to obtain test results, an appointment must be made with the physician to review the results. Test results cannot be discussed over the phone. Introducing [facility]! [employer] introduces Carista App patient portal. Now you can access parts of your medical record, email your doctor's office, and request medication refills online. 1. In your internet browser, go to [email or web address] 2. Click on the First Time User? Click Here link in the Sign In box. You will see the New Member Sign Up page. 3. Enter your Carista App Access Code exactly as it appears below. You will not need to use this code after youve completed the sign-up process. If you do not sign up before the expiration date, you must request a new code.  
 
· Carista App Access Code: [email or web address] 
 Expires: 10/7/2018  2:58 PM 
 
4. Enter the last four digits of your Social Security Number (xxxx) and Date of Birth (mm/dd/yyyy) as indicated and click Submit. You will be taken to the next sign-up page. 5. Create a Carte Blanche ID. This will be your Carte Blanche login ID and cannot be changed, so think of one that is secure and easy to remember. 6. Create a [employer]anche password. You can change your password at any time. 7. Enter your Password Reset Question and Answer. This can be used at a later time if you forget your password. 8. Enter your e-mail address. You will receive e-mail notification when new information is available in [address] 9. Click Sign Up. You can now view and download portions of your medical record. 10. Click the Download Summary menu link to download a portable copy of your medical information. If you have questions, please visit the Frequently Asked Questions section of the Carte Blanche website. Remember, Carte Blanche is NOT to be used for urgent needs. For medical emergencies, dial 911. Now available from your iPhone and Android! Please provide this summary of care documentation to your next provider. Your primary care clinician is listed as Orion Iglesias. If you have any questions after today's visit, please call [phone].

## 2018-10-04 NOTE — PATIENT INSTRUCTIONS
Office Policies 
o Phone calls/patient messages: Please allow up to 24 hours for someone in the office to contact you about your call or message. Be mindful your provider may be out of the office or your message may require further review. We encourage you to use Mlog for your messages as this is a faster, more efficient way to communicate with our office 
o Medication Refills: 
Prescription medications require up to 48 business hours to process. We encourage you to use Mlog for your refills. For controlled medications: Please allow up to 72 business hours to process. Certain medications may require you to  a written prescription at our office. NO narcotic/controlled medications will be prescribed after 4pm Monday through Friday or on weekends 
o Form/Paperwork Completion: 
Please note there is a $25 fee for all paperwork completed by our providers. We ask that you allow 7-14 business days. Pre-payment is due prior to picking up/faxing the completed form. You may also download your forms to Mlog to have your doctor print off. 
o Test Results: In order for a patient to obtain test results, an appointment must be made with the physician to review the results. Test results cannot be discussed over the phone.

## 2019-02-07 ENCOUNTER — OFFICE VISIT (OUTPATIENT)
Dept: NEUROLOGY | Age: 64
End: 2019-02-07

## 2019-02-07 VITALS
HEART RATE: 83 BPM | SYSTOLIC BLOOD PRESSURE: 124 MMHG | BODY MASS INDEX: 27.79 KG/M2 | DIASTOLIC BLOOD PRESSURE: 82 MMHG | OXYGEN SATURATION: 97 % | HEIGHT: 62 IN | WEIGHT: 151 LBS

## 2019-02-07 DIAGNOSIS — R41.3 MEMORY LOSS: Primary | ICD-10-CM

## 2019-02-07 DIAGNOSIS — F32.89 OTHER DEPRESSION: ICD-10-CM

## 2019-02-07 PROBLEM — F32.A MILD DEPRESSION: Status: ACTIVE | Noted: 2019-02-07

## 2019-02-07 RX ORDER — VENLAFAXINE HYDROCHLORIDE 37.5 MG/1
37.5 CAPSULE, EXTENDED RELEASE ORAL DAILY
Qty: 30 CAP | Refills: 5 | Status: SHIPPED | OUTPATIENT
Start: 2019-02-07 | End: 2019-12-19 | Stop reason: CLARIF

## 2019-02-07 RX ORDER — VENLAFAXINE HYDROCHLORIDE 75 MG/1
75 CAPSULE, EXTENDED RELEASE ORAL DAILY
Qty: 30 CAP | Refills: 5 | Status: SHIPPED | OUTPATIENT
Start: 2019-02-07 | End: 2019-12-19 | Stop reason: SDUPTHER

## 2019-02-07 NOTE — PATIENT INSTRUCTIONS
Call us in  4 weeks and let us know how your symptoms are doing. A Healthy Lifestyle: Care Instructions Your Care Instructions A healthy lifestyle can help you feel good, stay at a healthy weight, and have plenty of energy for both work and play. A healthy lifestyle is something you can share with your whole family. A healthy lifestyle also can lower your risk for serious health problems, such as high blood pressure, heart disease, and diabetes. You can follow a few steps listed below to improve your health and the health of your family. Follow-up care is a key part of your treatment and safety. Be sure to make and go to all appointments, and call your doctor if you are having problems. It's also a good idea to know your test results and keep a list of the medicines you take. How can you care for yourself at home? · Do not eat too much sugar, fat, or fast foods. You can still have dessert and treats now and then. The goal is moderation. · Start small to improve your eating habits. Pay attention to portion sizes, drink less juice and soda pop, and eat more fruits and vegetables. ? Eat a healthy amount of food. A 3-ounce serving of meat, for example, is about the size of a deck of cards. Fill the rest of your plate with vegetables and whole grains. ? Limit the amount of soda and sports drinks you have every day. Drink more water when you are thirsty. ? Eat at least 5 servings of fruits and vegetables every day. It may seem like a lot, but it is not hard to reach this goal. A serving or helping is 1 piece of fruit, 1 cup of vegetables, or 2 cups of leafy, raw vegetables. Have an apple or some carrot sticks as an afternoon snack instead of a candy bar. Try to have fruits and/or vegetables at every meal. 
· Make exercise part of your daily routine. You may want to start with simple activities, such as walking, bicycling, or slow swimming.  Try to be active 30 to 60 minutes every day. You do not need to do all 30 to 60 minutes all at once. For example, you can exercise 3 times a day for 10 or 20 minutes. Moderate exercise is safe for most people, but it is always a good idea to talk to your doctor before starting an exercise program. 
· Keep moving. Bridgett Posada the lawn, work in the garden, or [employer]. Take the stairs instead of the elevator at work. · If you smoke, quit. People who smoke have an increased risk for heart attack, stroke, cancer, and other lung illnesses. Quitting is hard, but there are ways to boost your chance of quitting tobacco for good. ? Use nicotine gum, patches, or lozenges. ? Ask your doctor about stop-smoking programs and medicines. ? Keep trying. In addition to reducing your risk of diseases in the future, you will notice some benefits soon after you stop using tobacco. If you have shortness of breath or asthma symptoms, they will likely get better within a few weeks after you quit. · Limit how much alcohol you drink. Moderate amounts of alcohol (up to 2 drinks a day for men, 1 drink a day for women) are okay. But drinking too much can lead to liver problems, high blood pressure, and other health problems. Family health If you have a family, there are many things you can do together to improve your health. · Eat meals together as a family as often as possible. · Eat healthy foods. This includes fruits, vegetables, lean meats and dairy, and whole grains. · Include your family in your fitness plan. Most people think of activities such as jogging or tennis as the way to fitness, but there are many ways you and your family can be more active. Anything that makes you breathe hard and gets your heart pumping is exercise. Here are some tips: 
? Walk to do errands or to take your child to school or the bus. 
? Go for a family bike ride after dinner instead of watching TV. Where can you learn more? Go to http://jeannine-krys.info/. Enter L966 in the search box to learn more about \"A Healthy Lifestyle: Care Instructions. \" Current as of: September 11, 2018 Content Version: 11.9 © 8097-5376 [employer]. Care instructions adapted under license by [employer] (which disclaims liability or warranty for this information). If you have questions about a medical condition or this instruction, always ask your healthcare professional. [place] [address] any warranty or liability for your use of this information. Office Policies Paperwork Any paperwork that needs to be completed has a 3 WEEK turnaround time. Phone calls/patient messages:· Please allow up to 24 hours for someone in the office to contact you about your call or message. Be mindful your provider may be out of the office or your message may require further review. We encourage you to use extraTKT for your messages as this is a faster, more efficient way to communicate with our office Medication Refills: · Prescription medications require up to 48 business hours to process. We encourage you to use extraTKT for your refills. · For controlled medications: Please allow up to 72 business hours to process. Certain medications may require you to  a written prescription at our office. · NO narcotic/controlled medications will be prescribed after 4pm Monday through Friday or on weekends

## 2019-02-11 ENCOUNTER — TELEPHONE (OUTPATIENT)
Dept: NEUROLOGY | Age: 64
End: 2019-02-11

## 2019-02-11 RX ORDER — AMITRIPTYLINE HYDROCHLORIDE 10 MG/1
TABLET, FILM COATED ORAL
Qty: 90 TAB | Refills: 5 | Status: SHIPPED | OUTPATIENT
Start: 2019-02-11 | End: 2019-05-16

## 2019-02-11 NOTE — TELEPHONE ENCOUNTER
----- Message from Juvencio Lechuga sent at 2/11/2019  1:22 PM EST -----  Regarding: Dr. Neda Hester: [phone]  Pt would like a call back to discuss (Antidepressant) medication and how much it costs.

## 2019-02-11 NOTE — TELEPHONE ENCOUNTER
No it was my understanding from our conversation in the office that she was taking 75 mg a day. If she is taking 150 mg daily of the venlafaxine I do not think we should increase it. I recommend we add some amitriptyline at night.,  I will write a prescription with the directions on it, I want her to call us when she is up to 3 pills at night, this is still a low dose of amitriptyline and tell us how she is doing and how she is sleeping.

## 2019-02-11 NOTE — TELEPHONE ENCOUNTER
Spoke to patient and she has script for Venlafaxine 75 mg   She was already taking 75 mg in AM  and 75 mg in PM when she came into office on 2/7/19  She received another script for 37.5 mg take 1 cap daily on 2/7/19  Wants to know if she is to take the 37.5 mg in AM and PM along with the 75 mg tabs  Dr. Luis Armando Wan advise

## 2019-02-22 ENCOUNTER — TELEPHONE (OUTPATIENT)
Dept: NEUROLOGY | Age: 64
End: 2019-02-22

## 2019-05-16 RX ORDER — AMITRIPTYLINE HYDROCHLORIDE 10 MG/1
TABLET, FILM COATED ORAL
Qty: 180 TAB | Refills: 3 | Status: CANCELLED | OUTPATIENT
Start: 2019-05-16

## 2019-05-16 RX ORDER — AMITRIPTYLINE HYDROCHLORIDE 10 MG/1
TABLET, FILM COATED ORAL
Qty: 180 TAB | Refills: 3 | Status: SHIPPED | OUTPATIENT
Start: 2019-05-16 | End: 2019-12-19 | Stop reason: SDUPTHER

## 2019-05-16 NOTE — TELEPHONE ENCOUNTER
Received refill request for amitriptyline from [address] to patient she is taking 1-2 po at night   Last refilled 2/11/19   Script changed to read 2 po at night  Dr. Jazz Gar refill

## 2019-05-16 NOTE — TELEPHONE ENCOUNTER
----- Message from Sudhir Delong sent at 5/16/2019  1:46 PM EDT -----  Regarding: Dr. Mary Gregg  Pt is returning call to Dr. Diane Auguste. Best contact is [phone].

## 2019-06-06 ENCOUNTER — OFFICE VISIT (OUTPATIENT)
Dept: NEUROLOGY | Age: 64
End: 2019-06-06

## 2019-06-06 VITALS
BODY MASS INDEX: 26.5 KG/M2 | WEIGHT: 144 LBS | OXYGEN SATURATION: 98 % | DIASTOLIC BLOOD PRESSURE: 82 MMHG | HEIGHT: 62 IN | SYSTOLIC BLOOD PRESSURE: 128 MMHG | HEART RATE: 74 BPM

## 2019-06-06 DIAGNOSIS — F32.89 OTHER DEPRESSION: Primary | ICD-10-CM

## 2019-06-06 RX ORDER — BUTALBITAL, ACETAMINOPHEN AND CAFFEINE 50; 325; 40 MG/1; MG/1; MG/1
1 TABLET ORAL
Qty: 40 TAB | Refills: 5 | Status: SHIPPED | OUTPATIENT
Start: 2019-06-06 | End: 2020-06-17 | Stop reason: SDUPTHER

## 2019-06-06 NOTE — PATIENT INSTRUCTIONS

## 2019-06-06 NOTE — PROGRESS NOTES
HISTORY OF PRESENT ILLNESS  Harshal Coronel is a 71 y.o. female. HPI Comments: This patient returns in follow-up. I have been seeing her for memory loss. Her memory is back to normal.  She was having some memory loss that was related to depression. She is still under pressure from her . He has significant chronic disease and is quite unpleasant to her. She has thought about leaving him but then there would be no one to take care of him. She is no longer taking the Lexapro that she was taking, she is taking venlafaxine Her memory has returned to baseline. She continues to do well on the venlafaxine 112.5 mg a day      Review of Systems   Constitutional:        Systems is positive for depression, fatigue, joint pain, memory loss, snoring. Complete review of systems done all others negative. Psychiatric/Behavioral: Positive for memory loss. Current Outpatient Medications on File Prior to Visit   Medication Sig Dispense Refill    amitriptyline (ELAVIL) 10 mg tablet Take 2 tabs at night 180 Tab 3    venlafaxine-SR (EFFEXOR-XR) 75 mg capsule Take 1 Cap by mouth daily. 30 Cap 5    venlafaxine-SR (EFFEXOR-XR) 37.5 mg capsule Take 1 Cap by mouth daily. 30 Cap 5    venlafaxine-SR (EFFEXOR-XR) 75 mg capsule 1 po bid  Indications: ANXIETY WITH DEPRESSION 180 Cap 3    cyanocobalamin (VITAMIN B-12) 1,000 mcg tablet Take 1,000 mcg by mouth daily.  venlafaxine-SR (EFFEXOR-XR) 37.5 mg capsule Take  2 p.o. nightly. Indications: major depressive disorder 180 Cap 3    potassium chloride (K-DUR, KLOR-CON) 20 mEq tablet Take  by mouth two (2) times a day.  multivitamin (ONE A DAY) tablet Take 1 Tab by mouth daily.  cholecalciferol (VITAMIN D3) 1,000 unit tablet Take  by mouth two (2) times a day.  oxybutynin chloride XL (DITROPAN XL) 15 mg CR tablet Take 15 mg by mouth two (2) times a day.  indapamide (LOZOL) 2.5 mg tablet Take 2.5 mg by mouth daily.       levothyroxine (LEVOTHROID) 88 mcg tablet Take 88 mcg by mouth Daily (before breakfast). No current facility-administered medications on file prior to visit. Past Medical History:   Diagnosis Date    Arthritis     Endocrine disease     Headache     Hypertension     Memory disorder     Thyroid disease      Family History   Problem Relation Age of Onset    Cancer Mother     Dementia Mother     Migraines Mother     Kidney Disease Father      Social History     Tobacco Use    Smoking status: Current Every Day Smoker     Packs/day: 0.50    Smokeless tobacco: Never Used   Substance Use Topics    Alcohol use: No    Drug use: No     Ht 5' 2\" (1.575 m)   BMI 27.62 kg/m²   Physical Exam   This patient appears somewhat depressed however I do not think she is any more depressed than when I last saw her. She is in a tough situation and I think a fair amount of this depression is situational.  Constitutional: Oriented to person, place, and time, appears well-developed and well-nourished. No distress. Neurological: Alert and oriented to person, place, and time. Displays no atrophy and no tremor. Gait and station are normal    Vitals reviewed. ASSESSMENT and PLAN  HYPERREFLEXIA  Unexplained but unchanged  MEMORY LOSS  Her memory is back to normal HYPERTENSION  Stroke risk, stable, managed by primary care  DEPRESSION  Her depression is under control on the current medication, I see no reason altered at this time. This note will not be viewable in [address]

## 2019-12-19 ENCOUNTER — OFFICE VISIT (OUTPATIENT)
Dept: NEUROLOGY | Age: 64
End: 2019-12-19

## 2019-12-19 VITALS
OXYGEN SATURATION: 98 % | HEART RATE: 76 BPM | HEIGHT: 62 IN | DIASTOLIC BLOOD PRESSURE: 70 MMHG | SYSTOLIC BLOOD PRESSURE: 128 MMHG | WEIGHT: 152 LBS | BODY MASS INDEX: 27.97 KG/M2

## 2019-12-19 DIAGNOSIS — F32.89 OTHER DEPRESSION: Primary | ICD-10-CM

## 2019-12-19 RX ORDER — VENLAFAXINE HYDROCHLORIDE 37.5 MG/1
CAPSULE, EXTENDED RELEASE ORAL
Qty: 180 CAP | Refills: 3 | Status: SHIPPED | OUTPATIENT
Start: 2019-12-19 | End: 2020-12-10 | Stop reason: CLARIF

## 2019-12-19 RX ORDER — AMITRIPTYLINE HYDROCHLORIDE 10 MG/1
TABLET, FILM COATED ORAL
Qty: 180 TAB | Refills: 3 | Status: SHIPPED | OUTPATIENT
Start: 2019-12-19 | End: 2020-06-25 | Stop reason: SDUPTHER

## 2019-12-19 NOTE — PROGRESS NOTES
Neurology Note    Patient ID:  Maylin Stahl  [id number]  71 y.o.  1955      Date of Consultation:  2019    Referring Physician: Dr. Neville Acuna    Reason for Consultation: Depression    Subjective: My  passed away       History of Present Illness:   Maylin Stahl is a 71 y.o. female who returns to the neurology clinic at [facility] for an evaluation. She was last seen in the clinic approximately 6 months ago by a former neurologist, Dr. Rosy Apodaca. 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 At that time she was taking both Lexapro and venlafaxine for her depression. She was doing really well but Since she was last here, her   in 2019. Since then he has been struggling with work. He was written up for the first time. 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 She is having trouble with adjusting to this new lifestyle. She has spoken to her employer and feels that she may need a time away to try to help her with coping. .       She states that her sleep is doing relatively well. She has just moved back into her bedroom As she was unable to live there immediately afterwards as it was too emotional for her. Still eating well, but not necessarily healthy. Exercise: does walk the dog daily. Past Medical History:   Diagnosis Date    Arthritis     Endocrine disease     Headache     Hypertension     Memory disorder     Thyroid disease         No past surgical history on file.      Family History   Problem Relation Age of Onset    Cancer Mother     Dementia Mother     Migraines Mother     Kidney Disease Father         Social History     Tobacco Use    Smoking status: Current Every Day Smoker     Packs/day: 0.50    Smokeless tobacco: Never Used   Substance Use Topics    Alcohol use: No        Allergies   Allergen Reactions  Sulfa (Sulfonamide Antibiotics) Nausea Only        Prior to Admission medications    Medication Sig Start Date End Date Taking? Authorizing Provider   butalbital-acetaminophen-caffeine (FIORICET, ESGIC) -40 mg per tablet Take 1 Tab by mouth every six (6) hours as needed for Pain. This medication is only to be filled in one month intervals  Indications: This medication is only to be filled in one month intervals 6/6/19  Yes Lili Hopkins MD   amitriptyline (ELAVIL) 10 mg tablet Take 2 tabs at night 5/16/19  Yes Lili Hopkins MD   venlafaxine-SR [facility] 37.5 mg capsule Take 1 Cap by mouth daily. 2/7/19  Yes Lili Hopkins MD   cyanocobalamin (VITAMIN B-12) 1,000 mcg tablet Take 1,000 mcg by mouth daily. Yes Provider, Historical   venlafaxine-SR (EFFEXOR-XR) 37.5 mg capsule Take  2 p.o. nightly. Indications: major depressive disorder 6/8/18  Yes Lili Hopkins MD   potassium chloride (K-DUR, KLOR-CON) 20 mEq tablet Take  by mouth two (2) times a day. Yes Provider, Historical   multivitamin (ONE A DAY) tablet Take 1 Tab by mouth daily. Yes Provider, Historical   cholecalciferol (VITAMIN D3) 1,000 unit tablet Take  by mouth two (2) times a day. Yes Provider, Historical   indapamide (LOZOL) 2.5 mg tablet Take 2.5 mg by mouth daily. Yes Other, MD Blanco   levothyroxine (LEVOTHROID) 88 mcg tablet Take 88 mcg by mouth Daily (before breakfast). Yes Other, MD Blanco   oxybutynin chloride XL (DITROPAN XL) 15 mg CR tablet Take 15 mg by mouth two (2) times a day.     Provider, Historical       Review of Systems:    General, constitutional: negative  Eyes, vision: negative  Ears, nose, throat: negative  Cardiovascular, heart: negative  Respiratory: negative  Gastrointestinal: negative  Genitourinary: negative  Musculoskeletal: negative  Skin and integumentary: negative  Psychiatric: dePression and anxiety  Endocrine: negative  Neurological: negative, except for HPI  Hematologic/lymphatic: negative  Allergy/immunology: negative      Objective:     Visit Vitals  /70   Pulse 76   Ht 5' 2\" (1.575 m)   Wt 152 lb (68.9 kg)   SpO2 98%   BMI 27.80 kg/m²       Physical Exam:      General:  appears well nourished in no acute distress  Neck: no carotid bruits  Lungs: clear to auscultation  Heart:  no murmurs, regular rate  Lower extremity: peripheral pulses palpable and no edema  Skin: intact    Neurological exam:    Awake, alert, oriented to person, place and time  Recent and remote memory were normal  Attention and concentration were intact  Language was intact. There was no aphasia  Speech: no dysarthria  Fund of knowledge was preserved    Cranial nerves:   II-XII were tested    Perrrla  Fundoscopic examination revealed venous pulsations and no clear abnormalities  Visual fields were full  Eomi, no evidence of nystagmus  Facial sensation:  normal and symmetric  Facial motor: normal and symmetric  Hearing intact  SCM strength intact  Tongue: midline without fasciculations    Motor: Tone normal    No evidence of fasciculations    Strength testing:   deltoid triceps biceps Wrist ext. Wrist flex. intrinsics Hip flex. Hip ext. Knee ext. Knee flex Dorsi flex Plantar flex   Right 5 5 5 5 5 5 5 5 5 5 5 5   Left 5 5 5 5 5 5 5 5 5 5 5 5         Sensory:  Upper extremity: intact to pp, light touch, and vibration > 10 seconds  Lower extremity: intact to pp, light touch, and vibration > 10 seconds    Reflexes:    Right Left  Biceps  2 2  Triceps 2 2  Brachiorad. 2 2  Patella  2 2  Achilles 2 2    Plantar response:  flexor bilaterally      Cerebellar testing:  no tremor apparent, finger/nose and drake were intact    Romberg: absent    Gait: steady.       Labs:     Lab Results   Component Value Date/Time    WBC 6.4 02/16/2014 06:32 PM    HCT 40.5 02/16/2014 06:32 PM    HGB 13.7 02/16/2014 06:32 PM    PLATELET 617 47/83/2859 06:32 PM       Imaging:    Results from [facility] encounter on 06/07/18   MRI CERV SPINE WO CONT    Narrative CLINICAL HISTORY: Hyperreflexia  INDICATION: Neck pain,  hyperreflexia    COMPARISON: None  TECHNIQUE: MR imaging of the cervical spine was performed including sagittal T1,  T2, STIR;  axial GRE, T1. Contrast was not administered. FINDINGS:    Loss of normal cervical lordosis. Mild disc desiccation loss of disc height C5-6  and C6-7. Disc bulge C5-6 and C6-7. [place] Vertebral body heights are maintained. Marrow signal is normal.  The craniocervical junction is intact. Minimal  effacement of ventral aspect of the cord at C5-6. There is no canal or foraminal  complex. Major cervical vascular structures are unremarkable. .      C2/3:  Disc desiccation and disc bulge. Canal and foramina are patent. C3/4:   The spinal canal and neuroforamina are widely patent. C4/5:   The spinal canal and neuroforamina are widely patent. C5/6: Disc bulge/osteophyte. Minimal facet degenerative change. Canal and  foramina are patent. C6/7:  Disc bulge with annular tear. Canal and foramina are patent. .     C7/T1:   The spinal canal and neuroforamina are widely patent. Impression IMPRESSION:  Minimal multilevel degenerative change. There is no canal or foraminal  compromise. There is no evidence of cord signal abnormality suggestive of demyelinating  process. .           Results from [place] encounter on 02/16/14   CTA ABD    Narrative **Final Report**       ICD Codes / Adm. Diagnosis: 12   / Back Pain    Examination:  CT ABDOMEN ANGIOGRAPHY  - [id number] - Feb 16 2014  8:10PM  Accession No:  [id number]  Reason:        REPORT:  INDICATION:  Back pain assess for aortic aneurysm, assess for pulmonary   embolus. History of left Thyroid nodule. COMPARISON:  No old study. TECHNIQUE:  Noncontrast images were obtained to localize the aorta.     Contiguous axial CT images were then obtained of the chest and abdomen with   100 mL of Optiray 350 contrast using the pulmonary angio protocol. 2D post   processing was utilized, and coronal and sagittal reformatted images were   obtained. In addition 3D post processing was utilized and MIP coronal   reconstructed images were obtained. FINDINGS:  CT CHEST:  No pulmonary embolus is detected. The ascending   thoracic aorta is slightly ectatic. No dissecting aneurysm is seen. No   pericardial effusion or pleural effusion is evident. The right lobe of the   thyroid gland is surgically absent. A 1.8 x 2.5 cm complex left lower   thyroid nodule is seen. CT ABDOMEN:  The abdominal aorta shows atherosclerotic mural calcification   and no aneurysmal dilatation. The celiac, superior mesenteric, inferior   mesenteric and bilateral renal arteries are patent. Several hepatic   hypodensities the largest measuring 5.0 x 5.3 cm suggesting cysts are seen. The spleen, pancreas, and adrenal glands show a satisfactory appearance. The kidneys show symmetric function, bilateral renal cysts, smaller   nonspecific hypodensities, and no hydronephrosis. A 1.0 x 1.5 cm probable   left retroperitoneal calcified lymph node is noted. The gallbladder is   unremarkable. The biliary ducts are not dilated. The visualized small bowel is normal in caliber. Thoracolumbar scoliosis   and degenerative change of the spine are noted. IMPRESSION:  No aneurysm or pulmonary embolus detected. Hepatic cysts and renal cysts. Signing/Reading Doctor: Florecita Perez ([id number])    Approved: Florecita Perez ([id number])  Feb 16 2014  8:49PM                                      Assessment and Plan:   The patient is a pleasant 69-year-old female who has been followed in the neurology clinic previously for a pseudodementia associated with depression who has a new situational depression after the passing away of her . Situational depression:  She will continue with her amitriptyline and venlafaxine.   Side effects of those medications were reviewed. Refills of those medications were sent to her pharmacy. I did complete an FMLA form to help her stay out of work until January 31 to help with her new adjustment in regards to her  passing away. I did ask her to arrange a follow-up visit with her primary care doctor to help with long-term management of her depression and all of her medical conditions. Patient Active Problem List   Diagnosis Code    Syncope and collapse R55    Reactive depression F32.9    Mild depression ([facility] [address] F32.0               The patient should return to clinic in 6-9 months. Renewed medication: yes, side effects reviewed. I spent  40   minutes with the patient  with over 50 % of the time counseling and coordinating the care plan in regards to the diagnosis, diagnostic testing, and treatment plan. The patient had the ability to ask questions and all questions were answered. This did include completing her FMLA paperwork.       Signed By:  Leana Gongora DO FAAN    December 19, 2019

## 2019-12-19 NOTE — PATIENT INSTRUCTIONS
A Healthy Lifestyle: Care Instructions Your Care Instructions A healthy lifestyle can help you feel good, stay at a healthy weight, and have plenty of energy for both work and play. A healthy lifestyle is something you can share with your whole family. A healthy lifestyle also can lower your risk for serious health problems, such as high blood pressure, heart disease, and diabetes. You can follow a few steps listed below to improve your health and the health of your family. Follow-up care is a key part of your treatment and safety. Be sure to make and go to all appointments, and call your doctor if you are having problems. It's also a good idea to know your test results and keep a list of the medicines you take. How can you care for yourself at home? · Do not eat too much sugar, fat, or fast foods. You can still have dessert and treats now and then. The goal is moderation. · Start small to improve your eating habits. Pay attention to portion sizes, drink less juice and soda pop, and eat more fruits and vegetables. ? Eat a healthy amount of food. A 3-ounce serving of meat, for example, is about the size of a deck of cards. Fill the rest of your plate with vegetables and whole grains. ? Limit the amount of soda and sports drinks you have every day. Drink more water when you are thirsty. ? Eat at least 5 servings of fruits and vegetables every day. It may seem like a lot, but it is not hard to reach this goal. A serving or helping is 1 piece of fruit, 1 cup of vegetables, or 2 cups of leafy, raw vegetables. Have an apple or some carrot sticks as an afternoon snack instead of a candy bar. Try to have fruits and/or vegetables at every meal. 
· Make exercise part of your daily routine. You may want to start with simple activities, such as walking, bicycling, or slow swimming. Try to be active 30 to 60 minutes every day.  You do not need to do all 30 to 60 minutes all at once. For example, you can exercise 3 times a day for 10 or 20 minutes. Moderate exercise is safe for most people, but it is always a good idea to talk to your doctor before starting an exercise program. 
· Keep moving. Arlyn Chock the lawn, work in the garden, or [employer]. Take the stairs instead of the elevator at work. · If you smoke, quit. People who smoke have an increased risk for heart attack, stroke, cancer, and other lung illnesses. Quitting is hard, but there are ways to boost your chance of quitting tobacco for good. ? Use nicotine gum, patches, or lozenges. ? Ask your doctor about stop-smoking programs and medicines. ? Keep trying. In addition to reducing your risk of diseases in the future, you will notice some benefits soon after you stop using tobacco. If you have shortness of breath or asthma symptoms, they will likely get better within a few weeks after you quit. · Limit how much alcohol you drink. Moderate amounts of alcohol (up to 2 drinks a day for men, 1 drink a day for women) are okay. But drinking too much can lead to liver problems, high blood pressure, and other health problems. Family health If you have a family, there are many things you can do together to improve your health. · Eat meals together as a family as often as possible. · Eat healthy foods. This includes fruits, vegetables, lean meats and dairy, and whole grains. · Include your family in your fitness plan. Most people think of activities such as jogging or tennis as the way to fitness, but there are many ways you and your family can be more active. Anything that makes you breathe hard and gets your heart pumping is exercise. Here are some tips: 
? Walk to do errands or to take your child to school or the bus. 
? Go for a family bike ride after dinner instead of watching TV. Where can you learn more? Go to http://jeannine-krys.info/. Enter W046 in the search box to learn more about \"A Healthy Lifestyle: Care Instructions. \" Current as of: May 28, 2019 Content Version: 12.2 © 7438-8354 [employer]. Care instructions adapted under license by [employer] (which disclaims liability or warranty for this information). If you have questions about a medical condition or this instruction, always ask your healthcare professional. [place] [address] any warranty or liability for your use of this information.

## 2019-12-20 ENCOUNTER — TELEPHONE (OUTPATIENT)
Dept: NEUROLOGY | Age: 64
End: 2019-12-20

## 2019-12-23 RX ORDER — VENLAFAXINE 75 MG/1
75 TABLET ORAL 2 TIMES DAILY
Qty: 180 TAB | Refills: 3 | Status: SHIPPED | OUTPATIENT
Start: 2019-12-23 | End: 2020-06-25 | Stop reason: SDUPTHER

## 2020-06-18 ENCOUNTER — DOCUMENTATION ONLY (OUTPATIENT)
Dept: NEUROLOGY | Age: 65
End: 2020-06-18

## 2020-06-18 RX ORDER — BUTALBITAL, ACETAMINOPHEN AND CAFFEINE 50; 325; 40 MG/1; MG/1; MG/1
1 TABLET ORAL
Qty: 20 TAB | Refills: 3 | Status: SHIPPED | OUTPATIENT
Start: 2020-06-18 | End: 2021-08-18

## 2020-06-24 NOTE — PROGRESS NOTES
Neurology Note    Patient ID:  Talya Miguel  [id number]  76 y.o.  1955      Date of Consultation:  June 25, 2020    Referring Physician: Dr. Genevie Castleman    Reason for Consultation: Depression    Subjective: I still have depression. History of Present Illness:   Talya Miguel is a 76 y.o. female who returns to the neurology clinic at [facility] for an evaluation. I first met the patient approximately 7 months ago on December 19, 2019. Please see my history of present illness, examination, and treatment based plan from that day. She was seen by neurologist in the past before my first encounter with her. 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 Since that time, she has retired. She is still struggling with her depression. She is still working her way through her 's death. Taking amitriptyline and effexor. His combination seems to be helping her. She is staying engaged with friends and family via the phone. She does walk regularly. She walks approximately 30 minutes every day. She was a bit concerned about her walking feel that she catches her foot intermittently. She does think this is related to her vision. She is scheduled to have a follow-up procedure on her retina and thinks that there is some difficulty with depth perception. She denies any new numbness, tingling, or weakness. Past Medical History:   Diagnosis Date    Arthritis     Endocrine disease     Headache     Hypertension     Memory disorder     Thyroid disease         No past surgical history on file. Family History   Problem Relation Age of Onset    Cancer Mother     Dementia Mother     Migraines Mother     Kidney Disease Father         Social History     Tobacco Use    Smoking status: Current Every Day Smoker     Packs/day: 0.50    Smokeless tobacco: Never Used   Substance Use Topics    Alcohol use:  No Allergies   Allergen Reactions    Sulfa (Sulfonamide Antibiotics) Nausea Only        Prior to Admission medications    Medication Sig Start Date End Date Taking? Authorizing Provider   amitriptyline (ELAVIL) 10 mg tablet Take 2 tabs at night 6/25/20  Yes Chet Shah DO   venlafaxine (EFFEXOR) 75 mg tablet Take 1 Tab by mouth two (2) times a day. 6/25/20  Yes Chet Shah DO   butalbital-acetaminophen-caffeine (FIORICET, ESGIC) -40 mg per tablet Take 1 Tab by mouth every six (6) hours as needed for Migraine. This medication is only to be filled in one month intervals  Indications: This medication is only to be filled in one month intervals 6/18/20  Yes Chet Shah DO   venlafaxine-SR (EFFEXOR-XR) 37.5 mg capsule Take  2 p.o. nightly. Indications: major depressive disorder 12/19/19  Yes Chet Shah,    potassium chloride (K-DUR, KLOR-CON) 20 mEq tablet Take  by mouth two (2) times a day. Yes Provider, Historical   multivitamin (ONE A DAY) tablet Take 1 Tab by mouth daily. Yes Provider, Historical   oxybutynin chloride XL (DITROPAN XL) 15 mg CR tablet Take 15 mg by mouth two (2) times a day. Yes Provider, Historical   indapamide (LOZOL) 2.5 mg tablet Take 2.5 mg by mouth daily. Yes Other, MD Blanco   levothyroxine (LEVOTHROID) 88 mcg tablet Take 88 mcg by mouth Daily (before breakfast). Yes Other, MD Blanco   cyanocobalamin (VITAMIN B-12) 1,000 mcg tablet Take 1,000 mcg by mouth daily. Provider, Historical   cholecalciferol (VITAMIN D3) 1,000 unit tablet Take  by mouth two (2) times a day.     Provider, Historical       Review of Systems:    General, constitutional: negative  Eyes, vision: negative  Ears, nose, throat: negative  Cardiovascular, heart: negative  Respiratory: negative  Gastrointestinal: negative  Genitourinary: negative  Musculoskeletal: negative  Skin and integumentary: negative  Psychiatric: dePression and anxiety  Endocrine: negative  Neurological: negative, except for HPI  Hematologic/lymphatic: negative  Allergy/immunology: negative      Objective:     Visit Vitals  /70   Pulse 79   Ht 5' 2\" (1.575 m)   Wt 147 lb (66.7 kg)   SpO2 98%   BMI 26.89 kg/m²       Physical Exam:      General:  appears well nourished in no acute distress  Neck: no carotid bruits  Lungs: clear to auscultation  Heart:  no murmurs, regular rate  Lower extremity: peripheral pulses palpable and no edema  Skin: intact    Neurological exam:    Awake, alert, oriented to person, place and time  Recent and remote memory were normal  Attention and concentration were intact  Language was intact. There was no aphasia  Speech: no dysarthria  Fund of knowledge was preserved    Cranial nerves:   II-XII were tested    Perrrla  Fundoscopic examination revealed venous pulsations and no clear abnormalities  Visual fields were full  Eomi, no evidence of nystagmus  Facial sensation:  normal and symmetric  Facial motor: normal and symmetric  Hearing intact  SCM strength intact  Tongue: midline without fasciculations    Motor: Tone normal    No evidence of fasciculations    Strength testing:   deltoid triceps biceps Wrist ext. Wrist flex. intrinsics Hip flex. Hip ext. Knee ext. Knee flex Dorsi flex Plantar flex   Right 5 5 5 5 5 5 5 5 5 5 5 5   Left 5 5 5 5 5 5 5 5 5 5 5 5         Sensory:  Upper extremity: intact to pp, light touch, and vibration > 10 seconds  Lower extremity: Pinprick is only minimally decreased on the dorsum of her toes. Vibration was 10 seconds    Reflexes:    Right Left  Biceps  2 2  Triceps 2 2  Brachiorad. 2 2  Patella  2 2  Achilles 2 2    Plantar response:  flexor bilaterally      Cerebellar testing:  no tremor apparent, finger/nose and drake were intact    Romberg: absent    Gait: steady.   She was able to tandem gait without any difficulty    Labs:     Lab Results   Component Value Date/Time    WBC 6.4 02/16/2014 06:32 PM    HCT 40.5 02/16/2014 06:32 PM    HGB 13.7 02/16/2014 06:32 PM PLATELET 394 91/37/0143 06:32 PM       Imaging:    Results from Hospital Encounter encounter on 06/07/18   MRI CERV SPINE WO CONT    Narrative CLINICAL HISTORY: Hyperreflexia  INDICATION: Neck pain,  hyperreflexia    COMPARISON: None  TECHNIQUE: MR imaging of the cervical spine was performed including sagittal T1,  T2, STIR;  axial GRE, T1. Contrast was not administered. FINDINGS:    Loss of normal cervical lordosis. Mild disc desiccation loss of disc height C5-6  and C6-7. Disc bulge C5-6 and C6-7. Torri Dye Vertebral body heights are maintained. Marrow signal is normal.  The craniocervical junction is intact. Minimal  effacement of ventral aspect of the cord at C5-6. There is no canal or foraminal  complex. Major cervical vascular structures are unremarkable. .      C2/3:  Disc desiccation and disc bulge. Canal and foramina are patent. C3/4:   The spinal canal and neuroforamina are widely patent. C4/5:   The spinal canal and neuroforamina are widely patent. C5/6: Disc bulge/osteophyte. Minimal facet degenerative change. Canal and  foramina are patent. C6/7:  Disc bulge with annular tear. Canal and foramina are patent. .     C7/T1:   The spinal canal and neuroforamina are widely patent. Impression IMPRESSION:  Minimal multilevel degenerative change. There is no canal or foraminal  compromise. There is no evidence of cord signal abnormality suggestive of demyelinating  process. .           Results from [place] encounter on 02/16/14   CTA ABD    Narrative **Final Report**       ICD Codes / Adm. Diagnosis: 12   / Back Pain    Examination:  CT ABDOMEN ANGIOGRAPHY  - [id number] - Feb 16 2014  8:10PM  Accession No:  [id number]  Reason:        REPORT:  INDICATION:  Back pain assess for aortic aneurysm, assess for pulmonary   embolus. History of left Thyroid nodule. COMPARISON:  No old study. TECHNIQUE:  Noncontrast images were obtained to localize the aorta.     Contiguous axial CT images were then obtained of the chest and abdomen with   100 mL of Optiray 350 contrast using the pulmonary angio protocol. 2D post   processing was utilized, and coronal and sagittal reformatted images were   obtained. In addition 3D post processing was utilized and MIP coronal   reconstructed images were obtained. FINDINGS:  CT CHEST:  No pulmonary embolus is detected. The ascending   thoracic aorta is slightly ectatic. No dissecting aneurysm is seen. No   pericardial effusion or pleural effusion is evident. The right lobe of the   thyroid gland is surgically absent. A 1.8 x 2.5 cm complex left lower   thyroid nodule is seen. CT ABDOMEN:  The abdominal aorta shows atherosclerotic mural calcification   and no aneurysmal dilatation. The celiac, superior mesenteric, inferior   mesenteric and bilateral renal arteries are patent. Several hepatic   hypodensities the largest measuring 5.0 x 5.3 cm suggesting cysts are seen. The spleen, pancreas, and adrenal glands show a satisfactory appearance. The kidneys show symmetric function, bilateral renal cysts, smaller   nonspecific hypodensities, and no hydronephrosis. A 1.0 x 1.5 cm probable   left retroperitoneal calcified lymph node is noted. The gallbladder is   unremarkable. The biliary ducts are not dilated. The visualized small bowel is normal in caliber. Thoracolumbar scoliosis   and degenerative change of the spine are noted. IMPRESSION:  No aneurysm or pulmonary embolus detected. Hepatic cysts and renal cysts. Signing/Reading Doctor: oDnald Corley ([id number])    Approved: Donald Corley ([id number])  Feb 16 2014  8:49PM                                Assessment and Plan:   The patient is a pleasant 69-year-old female who has been followed in the neurology clinic previously for a pseudodementia associated with depression who has a new superimposed situational depression after the passing away of her .   This has improved to some degree since she was last here but she is still struggling. Situational depression superimposed on her underlying depression.:  She will continue with her amitriptyline and venlafaxine. Side effects of those medications were reviewed. Refills of those medications were sent to her pharmacy. She currently feels that things are doing well. She is considering wanting to decrease the medications in the future pending how she is feeling. We did discuss this and how this may occur. She will notify me as things progress    The current societal events of the pandemic are not helping her in regards to her depression. It has been a positive thing for her to have stopped working and this has helped. Coronavirus pandemic:  I did discuss with the patient at length the importance of social distancing and proper hygiene especially during these times. The patient is at a higher risk of having a more severe course of the disease and needs to follow all CDC recommendations. The patient acknowledges. Patient Active Problem List   Diagnosis Code    Syncope and collapse R55    Reactive depression F32.9    Mild depression ([facility] [address] F32.0               The patient should return to clinic in 6-9 months. Renewed medication: yes, side effects reviewed. I spent  25   minutes with the patient  with over 50 % of the time counseling and coordinating the care plan in regards to the diagnosis, diagnostic testing, and treatment plan. The patient had the ability to ask questions and all questions were answered.          Signed By:  Nancy Webber DO FAAN    June 25, 2020

## 2020-06-25 ENCOUNTER — OFFICE VISIT (OUTPATIENT)
Dept: NEUROLOGY | Age: 65
End: 2020-06-25

## 2020-06-25 VITALS
WEIGHT: 147 LBS | BODY MASS INDEX: 27.05 KG/M2 | HEIGHT: 62 IN | OXYGEN SATURATION: 98 % | SYSTOLIC BLOOD PRESSURE: 130 MMHG | HEART RATE: 79 BPM | DIASTOLIC BLOOD PRESSURE: 70 MMHG

## 2020-06-25 DIAGNOSIS — F32.4 MAJOR DEPRESSIVE DISORDER WITH SINGLE EPISODE, IN PARTIAL REMISSION (HCC): Primary | ICD-10-CM

## 2020-06-25 RX ORDER — AMITRIPTYLINE HYDROCHLORIDE 10 MG/1
TABLET, FILM COATED ORAL
Qty: 180 TAB | Refills: 3 | Status: SHIPPED | OUTPATIENT
Start: 2020-06-25 | End: 2020-12-10 | Stop reason: SDUPTHER

## 2020-06-25 RX ORDER — VENLAFAXINE 75 MG/1
75 TABLET ORAL 2 TIMES DAILY
Qty: 180 TAB | Refills: 3 | Status: SHIPPED | OUTPATIENT
Start: 2020-06-25 | End: 2020-12-10 | Stop reason: SDUPTHER

## 2020-08-07 ENCOUNTER — APPOINTMENT (OUTPATIENT)
Dept: MRI IMAGING | Age: 65
DRG: 948 | End: 2020-08-07
Attending: HOSPITALIST
Payer: MEDICARE

## 2020-08-07 ENCOUNTER — APPOINTMENT (OUTPATIENT)
Dept: GENERAL RADIOLOGY | Age: 65
DRG: 948 | End: 2020-08-07
Attending: EMERGENCY MEDICINE
Payer: MEDICARE

## 2020-08-07 ENCOUNTER — APPOINTMENT (OUTPATIENT)
Dept: CT IMAGING | Age: 65
DRG: 948 | End: 2020-08-07
Attending: EMERGENCY MEDICINE
Payer: MEDICARE

## 2020-08-07 ENCOUNTER — HOSPITAL ENCOUNTER (INPATIENT)
Age: 65
LOS: 2 days | Discharge: HOME | DRG: 948 | End: 2020-08-09
Attending: EMERGENCY MEDICINE | Admitting: HOSPITALIST
Payer: MEDICARE

## 2020-08-07 DIAGNOSIS — E83.42 HYPOMAGNESEMIA: ICD-10-CM

## 2020-08-07 DIAGNOSIS — R41.89 PSEUDODEMENTIA: ICD-10-CM

## 2020-08-07 DIAGNOSIS — G93.41 METABOLIC ENCEPHALOPATHY: ICD-10-CM

## 2020-08-07 DIAGNOSIS — E87.6 HYPOKALEMIA: ICD-10-CM

## 2020-08-07 DIAGNOSIS — E03.9 ACQUIRED HYPOTHYROIDISM: ICD-10-CM

## 2020-08-07 DIAGNOSIS — I10 ESSENTIAL HYPERTENSION: ICD-10-CM

## 2020-08-07 DIAGNOSIS — G93.40 ACUTE ENCEPHALOPATHY: Primary | ICD-10-CM

## 2020-08-07 PROBLEM — R41.82 AMS (ALTERED MENTAL STATUS): Status: ACTIVE | Noted: 2020-08-07

## 2020-08-07 LAB
ALBUMIN SERPL-MCNC: 3.9 G/DL (ref 3.5–5)
ALBUMIN/GLOB SERPL: 1.2 {RATIO} (ref 1.1–2.2)
ALP SERPL-CCNC: 94 U/L (ref 45–117)
ALT SERPL-CCNC: 19 U/L (ref 12–78)
AMMONIA PLAS-SCNC: 24 UMOL/L
ANION GAP SERPL CALC-SCNC: 5 MMOL/L (ref 5–15)
APAP SERPL-MCNC: <2 UG/ML (ref 10–30)
APPEARANCE UR: CLEAR
APTT PPP: 27.5 SEC (ref 22.1–32)
AST SERPL-CCNC: 20 U/L (ref 15–37)
ATRIAL RATE: 84 BPM
BACTERIA URNS QL MICRO: NEGATIVE /HPF
BASOPHILS # BLD: 0 K/UL (ref 0–0.1)
BASOPHILS NFR BLD: 0 % (ref 0–1)
BILIRUB SERPL-MCNC: 1.8 MG/DL (ref 0.2–1)
BILIRUB UR QL: NEGATIVE
BUN SERPL-MCNC: 13 MG/DL (ref 6–20)
BUN/CREAT SERPL: 13 (ref 12–20)
CALCIUM SERPL-MCNC: 9.7 MG/DL (ref 8.5–10.1)
CALCULATED P AXIS, ECG09: 37 DEGREES
CALCULATED R AXIS, ECG10: -27 DEGREES
CALCULATED T AXIS, ECG11: 50 DEGREES
CHLORIDE SERPL-SCNC: 92 MMOL/L (ref 97–108)
CO2 SERPL-SCNC: 32 MMOL/L (ref 21–32)
COLOR UR: ABNORMAL
COMMENT, HOLDF: NORMAL
COMMENT, HOLDF: NORMAL
CREAT SERPL-MCNC: 1.02 MG/DL (ref 0.55–1.02)
DIAGNOSIS, 93000: NORMAL
DIFFERENTIAL METHOD BLD: ABNORMAL
EOSINOPHIL # BLD: 0 K/UL (ref 0–0.4)
EOSINOPHIL NFR BLD: 0 % (ref 0–7)
EPITH CASTS URNS QL MICRO: ABNORMAL /LPF
ERYTHROCYTE [DISTWIDTH] IN BLOOD BY AUTOMATED COUNT: 13.3 % (ref 11.5–14.5)
EST. AVERAGE GLUCOSE BLD GHB EST-MCNC: 97 MG/DL
ETHANOL SERPL-MCNC: <10 MG/DL
GLOBULIN SER CALC-MCNC: 3.3 G/DL (ref 2–4)
GLUCOSE SERPL-MCNC: 125 MG/DL (ref 65–100)
GLUCOSE UR STRIP.AUTO-MCNC: NEGATIVE MG/DL
HBA1C MFR BLD: 5 % (ref 4–5.6)
HCT VFR BLD AUTO: 40.8 % (ref 35–47)
HGB BLD-MCNC: 14.4 G/DL (ref 11.5–16)
HGB UR QL STRIP: ABNORMAL
HYALINE CASTS URNS QL MICRO: ABNORMAL /LPF (ref 0–5)
IMM GRANULOCYTES # BLD AUTO: 0 K/UL (ref 0–0.04)
IMM GRANULOCYTES NFR BLD AUTO: 0 % (ref 0–0.5)
INR PPP: 1 (ref 0.9–1.1)
KETONES UR QL STRIP.AUTO: 15 MG/DL
LEUKOCYTE ESTERASE UR QL STRIP.AUTO: ABNORMAL
LYMPHOCYTES # BLD: 1 K/UL (ref 0.8–3.5)
LYMPHOCYTES NFR BLD: 10 % (ref 12–49)
MAGNESIUM SERPL-MCNC: 1.3 MG/DL (ref 1.6–2.4)
MAGNESIUM SERPL-MCNC: 2.4 MG/DL (ref 1.6–2.4)
MCH RBC QN AUTO: 29.1 PG (ref 26–34)
MCHC RBC AUTO-ENTMCNC: 35.3 G/DL (ref 30–36.5)
MCV RBC AUTO: 82.6 FL (ref 80–99)
MONOCYTES # BLD: 1 K/UL (ref 0–1)
MONOCYTES NFR BLD: 10 % (ref 5–13)
NEUTS SEG # BLD: 8 K/UL (ref 1.8–8)
NEUTS SEG NFR BLD: 80 % (ref 32–75)
NITRITE UR QL STRIP.AUTO: NEGATIVE
NRBC # BLD: 0 K/UL (ref 0–0.01)
NRBC BLD-RTO: 0 PER 100 WBC
P-R INTERVAL, ECG05: 132 MS
PH UR STRIP: 7.5 [PH] (ref 5–8)
PLATELET # BLD AUTO: 300 K/UL (ref 150–400)
PMV BLD AUTO: 9.5 FL (ref 8.9–12.9)
POTASSIUM SERPL-SCNC: 2.2 MMOL/L (ref 3.5–5.1)
POTASSIUM SERPL-SCNC: 2.3 MMOL/L (ref 3.5–5.1)
PROT SERPL-MCNC: 7.2 G/DL (ref 6.4–8.2)
PROT UR STRIP-MCNC: 100 MG/DL
PROTHROMBIN TIME: 10.9 SEC (ref 9–11.1)
Q-T INTERVAL, ECG07: 448 MS
QRS DURATION, ECG06: 108 MS
QTC CALCULATION (BEZET), ECG08: 529 MS
RBC # BLD AUTO: 4.94 M/UL (ref 3.8–5.2)
RBC #/AREA URNS HPF: >100 /HPF (ref 0–5)
SALICYLATES SERPL-MCNC: <1.7 MG/DL (ref 2.8–20)
SAMPLES BEING HELD,HOLD: NORMAL
SAMPLES BEING HELD,HOLD: NORMAL
SODIUM SERPL-SCNC: 129 MMOL/L (ref 136–145)
SP GR UR REFRACTOMETRY: 1.01 (ref 1–1.03)
THERAPEUTIC RANGE,PTTT: NORMAL SECS (ref 58–77)
TROPONIN I SERPL-MCNC: <0.05 NG/ML
TROPONIN I SERPL-MCNC: <0.05 NG/ML
UROBILINOGEN UR QL STRIP.AUTO: 1 EU/DL (ref 0.2–1)
VENTRICULAR RATE, ECG03: 84 BPM
WBC # BLD AUTO: 10.1 K/UL (ref 3.6–11)
WBC URNS QL MICRO: ABNORMAL /HPF (ref 0–4)

## 2020-08-07 PROCEDURE — 85610 PROTHROMBIN TIME: CPT

## 2020-08-07 PROCEDURE — 85730 THROMBOPLASTIN TIME PARTIAL: CPT

## 2020-08-07 PROCEDURE — 80307 DRUG TEST PRSMV CHEM ANLYZR: CPT

## 2020-08-07 PROCEDURE — 96375 TX/PRO/DX INJ NEW DRUG ADDON: CPT

## 2020-08-07 PROCEDURE — 74011250636 HC RX REV CODE- 250/636: Performed by: HOSPITALIST

## 2020-08-07 PROCEDURE — 70496 CT ANGIOGRAPHY HEAD: CPT

## 2020-08-07 PROCEDURE — 82140 ASSAY OF AMMONIA: CPT

## 2020-08-07 PROCEDURE — 74011250636 HC RX REV CODE- 250/636: Performed by: EMERGENCY MEDICINE

## 2020-08-07 PROCEDURE — 83036 HEMOGLOBIN GLYCOSYLATED A1C: CPT

## 2020-08-07 PROCEDURE — 85025 COMPLETE CBC W/AUTO DIFF WBC: CPT

## 2020-08-07 PROCEDURE — 99285 EMERGENCY DEPT VISIT HI MDM: CPT

## 2020-08-07 PROCEDURE — 70551 MRI BRAIN STEM W/O DYE: CPT

## 2020-08-07 PROCEDURE — 0042T CT CODE NEURO PERF W CBF: CPT

## 2020-08-07 PROCEDURE — 65660000000 HC RM CCU STEPDOWN

## 2020-08-07 PROCEDURE — 36415 COLL VENOUS BLD VENIPUNCTURE: CPT

## 2020-08-07 PROCEDURE — 86592 SYPHILIS TEST NON-TREP QUAL: CPT

## 2020-08-07 PROCEDURE — 74011250637 HC RX REV CODE- 250/637: Performed by: HOSPITALIST

## 2020-08-07 PROCEDURE — 74011636320 HC RX REV CODE- 636/320: Performed by: EMERGENCY MEDICINE

## 2020-08-07 PROCEDURE — 70450 CT HEAD/BRAIN W/O DYE: CPT

## 2020-08-07 PROCEDURE — 80053 COMPREHEN METABOLIC PANEL: CPT

## 2020-08-07 PROCEDURE — 96366 THER/PROPH/DIAG IV INF ADDON: CPT

## 2020-08-07 PROCEDURE — 71045 X-RAY EXAM CHEST 1 VIEW: CPT

## 2020-08-07 PROCEDURE — 83735 ASSAY OF MAGNESIUM: CPT

## 2020-08-07 PROCEDURE — 81001 URINALYSIS AUTO W/SCOPE: CPT

## 2020-08-07 PROCEDURE — 84484 ASSAY OF TROPONIN QUANT: CPT

## 2020-08-07 PROCEDURE — 87389 HIV-1 AG W/HIV-1&-2 AB AG IA: CPT

## 2020-08-07 PROCEDURE — 96365 THER/PROPH/DIAG IV INF INIT: CPT

## 2020-08-07 PROCEDURE — 93005 ELECTROCARDIOGRAM TRACING: CPT

## 2020-08-07 PROCEDURE — 84132 ASSAY OF SERUM POTASSIUM: CPT

## 2020-08-07 RX ORDER — ACETAMINOPHEN 325 MG/1
650 TABLET ORAL
Status: DISCONTINUED | OUTPATIENT
Start: 2020-08-07 | End: 2020-08-07 | Stop reason: SDUPTHER

## 2020-08-07 RX ORDER — POTASSIUM CHLORIDE 7.45 MG/ML
10 INJECTION INTRAVENOUS
Status: COMPLETED | OUTPATIENT
Start: 2020-08-07 | End: 2020-08-08

## 2020-08-07 RX ORDER — ENOXAPARIN SODIUM 100 MG/ML
40 INJECTION SUBCUTANEOUS DAILY
Status: DISCONTINUED | OUTPATIENT
Start: 2020-08-08 | End: 2020-08-09 | Stop reason: HOSPADM

## 2020-08-07 RX ORDER — HYDRALAZINE HYDROCHLORIDE 20 MG/ML
10 INJECTION INTRAMUSCULAR; INTRAVENOUS
Status: DISCONTINUED | OUTPATIENT
Start: 2020-08-07 | End: 2020-08-09 | Stop reason: HOSPADM

## 2020-08-07 RX ORDER — ONDANSETRON 2 MG/ML
4 INJECTION INTRAMUSCULAR; INTRAVENOUS
Status: DISCONTINUED | OUTPATIENT
Start: 2020-08-07 | End: 2020-08-09 | Stop reason: HOSPADM

## 2020-08-07 RX ORDER — HALOPERIDOL 5 MG/ML
2.5 INJECTION INTRAMUSCULAR
Status: COMPLETED | OUTPATIENT
Start: 2020-08-07 | End: 2020-08-07

## 2020-08-07 RX ORDER — SODIUM CHLORIDE 0.9 % (FLUSH) 0.9 %
5-40 SYRINGE (ML) INJECTION AS NEEDED
Status: DISCONTINUED | OUTPATIENT
Start: 2020-08-07 | End: 2020-08-09 | Stop reason: HOSPADM

## 2020-08-07 RX ORDER — ACETAMINOPHEN 650 MG/1
650 SUPPOSITORY RECTAL
Status: DISCONTINUED | OUTPATIENT
Start: 2020-08-07 | End: 2020-08-09 | Stop reason: HOSPADM

## 2020-08-07 RX ORDER — POTASSIUM CHLORIDE 7.45 MG/ML
10 INJECTION INTRAVENOUS
Status: DISPENSED | OUTPATIENT
Start: 2020-08-07 | End: 2020-08-07

## 2020-08-07 RX ORDER — ACETAMINOPHEN 650 MG/1
650 SUPPOSITORY RECTAL
Status: DISCONTINUED | OUTPATIENT
Start: 2020-08-07 | End: 2020-08-07 | Stop reason: SDUPTHER

## 2020-08-07 RX ORDER — ONDANSETRON 2 MG/ML
4 INJECTION INTRAMUSCULAR; INTRAVENOUS
Status: DISPENSED | OUTPATIENT
Start: 2020-08-07 | End: 2020-08-08

## 2020-08-07 RX ORDER — POTASSIUM CHLORIDE 20 MEQ/1
20 TABLET, EXTENDED RELEASE ORAL 2 TIMES DAILY
Status: DISCONTINUED | OUTPATIENT
Start: 2020-08-07 | End: 2020-08-09 | Stop reason: HOSPADM

## 2020-08-07 RX ORDER — PROMETHAZINE HYDROCHLORIDE 25 MG/1
12.5 TABLET ORAL
Status: DISCONTINUED | OUTPATIENT
Start: 2020-08-07 | End: 2020-08-09 | Stop reason: HOSPADM

## 2020-08-07 RX ORDER — LEVOTHYROXINE SODIUM 88 UG/1
88 TABLET ORAL
Status: DISCONTINUED | OUTPATIENT
Start: 2020-08-08 | End: 2020-08-09 | Stop reason: HOSPADM

## 2020-08-07 RX ORDER — SODIUM CHLORIDE 0.9 % (FLUSH) 0.9 %
10 SYRINGE (ML) INJECTION
Status: COMPLETED | OUTPATIENT
Start: 2020-08-07 | End: 2020-08-07

## 2020-08-07 RX ORDER — POLYETHYLENE GLYCOL 3350 17 G/17G
17 POWDER, FOR SOLUTION ORAL DAILY PRN
Status: DISCONTINUED | OUTPATIENT
Start: 2020-08-07 | End: 2020-08-09 | Stop reason: HOSPADM

## 2020-08-07 RX ORDER — HALOPERIDOL 5 MG/ML
2.5 INJECTION INTRAMUSCULAR ONCE
Status: COMPLETED | OUTPATIENT
Start: 2020-08-07 | End: 2020-08-07

## 2020-08-07 RX ORDER — HALOPERIDOL 5 MG/ML
2.5 INJECTION INTRAMUSCULAR ONCE
Status: DISCONTINUED | OUTPATIENT
Start: 2020-08-07 | End: 2020-08-07

## 2020-08-07 RX ORDER — SODIUM CHLORIDE 0.9 % (FLUSH) 0.9 %
5-40 SYRINGE (ML) INJECTION EVERY 8 HOURS
Status: DISCONTINUED | OUTPATIENT
Start: 2020-08-07 | End: 2020-08-09 | Stop reason: HOSPADM

## 2020-08-07 RX ORDER — MAGNESIUM SULFATE HEPTAHYDRATE 40 MG/ML
2 INJECTION, SOLUTION INTRAVENOUS
Status: COMPLETED | OUTPATIENT
Start: 2020-08-07 | End: 2020-08-07

## 2020-08-07 RX ORDER — POTASSIUM CHLORIDE 20 MEQ/1
20 TABLET, EXTENDED RELEASE ORAL 2 TIMES DAILY
Status: DISCONTINUED | OUTPATIENT
Start: 2020-08-07 | End: 2020-08-07

## 2020-08-07 RX ORDER — ACETAMINOPHEN 325 MG/1
650 TABLET ORAL
Status: DISCONTINUED | OUTPATIENT
Start: 2020-08-07 | End: 2020-08-09 | Stop reason: HOSPADM

## 2020-08-07 RX ADMIN — HALOPERIDOL LACTATE 2.5 MG: 5 INJECTION, SOLUTION INTRAMUSCULAR at 13:35

## 2020-08-07 RX ADMIN — Medication 10 ML: at 13:08

## 2020-08-07 RX ADMIN — MAGNESIUM SULFATE IN WATER 2 G: 40 INJECTION, SOLUTION INTRAVENOUS at 18:15

## 2020-08-07 RX ADMIN — HALOPERIDOL LACTATE 2.5 MG: 5 INJECTION, SOLUTION INTRAMUSCULAR at 19:10

## 2020-08-07 RX ADMIN — POTASSIUM CHLORIDE 10 MEQ: 7.46 INJECTION, SOLUTION INTRAVENOUS at 15:29

## 2020-08-07 RX ADMIN — POTASSIUM CHLORIDE 20 MEQ: 20 TABLET, EXTENDED RELEASE ORAL at 22:26

## 2020-08-07 RX ADMIN — IOPAMIDOL 110 ML: 755 INJECTION, SOLUTION INTRAVENOUS at 13:08

## 2020-08-07 RX ADMIN — POTASSIUM CHLORIDE 10 MEQ: 7.46 INJECTION, SOLUTION INTRAVENOUS at 21:44

## 2020-08-07 NOTE — ED NOTES
Pt pulled to MRI. Pt agitated and attempted to climb off the table. Pt given 2.5 haldol to finish MRI. 1935:  Pt back in room with daughter at bedside.

## 2020-08-07 NOTE — ED NOTES
Pt arrievs via EMS. Level 2 stroke called. Per EMS: BG enroute is 162. Pt alert but not oriented. Pt is very agitated. Pt arrives with home meds--meds for Pt dog were found in the bag. Last known well is 1600 yesterday. Dr Zbigniew Gates met EMS at [facility] 994 doors to evaluate Pt. Pt taken to CT immediately: Pt tolerated CT w/o contrast. Pt became very agitated and combative when CT w/contrast began--Ct w/contrast postponed until Pt is calm. Daughter Manjinder Richmond [phone]) at bedside at this time. Pt is lying in bed at this time and is less agitated. Pt is afebrile at this time and is unable to assess whether or not she is in pain.

## 2020-08-07 NOTE — ED NOTES
Bedside and Verbal shift change report given to Claudia Walker RN (oncoming nurse) by Sumanth Camarena RN (offgoing nurse). Report included the following information SBAR, ED Summary, MAR and Recent Results.

## 2020-08-07 NOTE — ED PROVIDER NOTES
EMERGENCY DEPARTMENT HISTORY AND PHYSICAL EXAM      Date: 8/7/2020  Patient Name: Viky Jackson    History of Presenting Illness     Chief Complaint   Patient presents with    Altered mental status    Other     stroke       History Provided By: Patient, Patient's Daughter and EMS    HPI: Viky Jackson, 76 y.o. female  presents to the ED with cc of altered mental status. Patient's daughter states that she was last known well at 4 PM yesterday when she last talked to her. Reportedly according to EMS the patient called a neighbor this morning stating that she did not feel well and needed help. EMS states there were multiple historians on scene but none of them really had a good history for her. She was very confused and disoriented. She constantly stated that she needed help. She was not complaining of any chest pain or shortness of breath. Reportedly she was having headache and nausea. Unknown when the headache and nausea started. She has a history of severe depression and some pseudodementia. Daughter states she has never seen her mother act like this before in the past.  She states she did have an episode of hypokalemia have resulted in some mental status changes but again it does not appear that anything this bad. No known fevers or chills. Other than above the patient is unable to provide history. Past History     Past Medical History:  Past Medical History:   Diagnosis Date    Arthritis     Endocrine disease     Headache     Hypertension     Memory disorder     Thyroid disease        Past Surgical History:  History reviewed. No pertinent surgical history. Medications:  No current facility-administered medications on file prior to encounter. Current Outpatient Medications on File Prior to Encounter   Medication Sig Dispense Refill    amitriptyline (ELAVIL) 10 mg tablet Take 2 tabs at night 180 Tab 3    venlafaxine (EFFEXOR) 75 mg tablet Take 1 Tab by mouth two (2) times a day. 180 Tab 3    butalbital-acetaminophen-caffeine (FIORICET, ESGIC) -40 mg per tablet Take 1 Tab by mouth every six (6) hours as needed for Migraine. This medication is only to be filled in one month intervals  Indications: This medication is only to be filled in one month intervals 20 Tab 3    venlafaxine-SR (EFFEXOR-XR) 37.5 mg capsule Take  2 p.o. nightly. Indications: major depressive disorder 180 Cap 3    cyanocobalamin (VITAMIN B-12) 1,000 mcg tablet Take 1,000 mcg by mouth daily.  potassium chloride (K-DUR, KLOR-CON) 20 mEq tablet Take  by mouth two (2) times a day.  multivitamin (ONE A DAY) tablet Take 1 Tab by mouth daily.  cholecalciferol (VITAMIN D3) 1,000 unit tablet Take  by mouth two (2) times a day.  oxybutynin chloride XL (DITROPAN XL) 15 mg CR tablet Take 15 mg by mouth two (2) times a day.  indapamide (LOZOL) 2.5 mg tablet Take 2.5 mg by mouth daily.  levothyroxine (LEVOTHROID) 88 mcg tablet Take 88 mcg by mouth Daily (before breakfast). Family History:  Family History   Problem Relation Age of Onset   24 [facility] Cancer Mother     Dementia Mother     Migraines Mother     Kidney Disease Father        Social History:  Social History     Tobacco Use    Smoking status: Current Every Day Smoker     Packs/day: 0.50    Smokeless tobacco: Never Used   Substance Use Topics    Alcohol use: No    Drug use: No       Allergies: Allergies   Allergen Reactions    Sulfa (Sulfonamide Antibiotics) Nausea Only       All the above components of the past  history are auto-populated from the electronic record. They have been reviewed and the patient has been interviewed for any pertinent past history that pertains to the patient's chief complaint and reason for visit. Not all pre-populated components may be accurate at the time this note was generated.     Review of Systems   Review of Systems   Unable to perform ROS: Mental status change       Physical Exam   Physical Exam  Vitals signs and nursing note reviewed. Constitutional:       General: She is not in acute distress. Appearance: She is well-developed. She is ill-appearing. HENT:      Head: Normocephalic and atraumatic. Mouth/Throat:      Mouth: Mucous membranes are moist.   Eyes:      Extraocular Movements: Extraocular movements intact. Conjunctiva/sclera: Conjunctivae normal.      Pupils: Pupils are equal, round, and reactive to light. Neck:      Musculoskeletal: Neck supple. Cardiovascular:      Rate and Rhythm: Normal rate and regular rhythm. Pulmonary:      Effort: Pulmonary effort is normal. No accessory muscle usage or respiratory distress. Breath sounds: Normal breath sounds. Abdominal:      General: There is no distension. Palpations: Abdomen is soft. Tenderness: There is no abdominal tenderness. Lymphadenopathy:      Cervical: No cervical adenopathy. Skin:     General: Skin is warm and dry. Neurological:      Mental Status: She is alert. She is disoriented and confused. Cranial Nerves: No cranial nerve deficit, dysarthria or facial asymmetry. Sensory: No sensory deficit. Motor: No weakness. Psychiatric:         Mood and Affect: Mood is anxious. Affect is inappropriate. Speech: Speech normal.         Behavior: Behavior is agitated. Cognition and Memory: Cognition is impaired. Memory is impaired.          Diagnostic Study Results     Labs -     Recent Results (from the past 24 hour(s))   CBC WITH AUTOMATED DIFF    Collection Time: 08/07/20  2:03 PM   Result Value Ref Range    WBC 10.1 3.6 - 11.0 K/uL    RBC 4.94 3.80 - 5.20 M/uL    HGB 14.4 11.5 - 16.0 g/dL    HCT 40.8 35.0 - 47.0 %    MCV 82.6 80.0 - 99.0 FL    MCH 29.1 26.0 - 34.0 PG    MCHC 35.3 30.0 - 36.5 g/dL    RDW 13.3 11.5 - 14.5 %    PLATELET 051 156 - 932 K/uL    MPV 9.5 8.9 - 12.9 FL    NRBC 0.0 0  WBC    ABSOLUTE NRBC 0.00 0.00 - 0.01 K/uL    NEUTROPHILS 80 (H) 32 - 75 %    LYMPHOCYTES 10 (L) 12 - 49 %    MONOCYTES 10 5 - 13 %    EOSINOPHILS 0 0 - 7 %    BASOPHILS 0 0 - 1 %    IMMATURE GRANULOCYTES 0 0.0 - 0.5 %    ABS. NEUTROPHILS 8.0 1.8 - 8.0 K/UL    ABS. LYMPHOCYTES 1.0 0.8 - 3.5 K/UL    ABS. MONOCYTES 1.0 0.0 - 1.0 K/UL    ABS. EOSINOPHILS 0.0 0.0 - 0.4 K/UL    ABS. BASOPHILS 0.0 0.0 - 0.1 K/UL    ABS. IMM. GRANS. 0.0 0.00 - 0.04 K/UL    DF AUTOMATED     METABOLIC PANEL, COMPREHENSIVE    Collection Time: 08/07/20  2:03 PM   Result Value Ref Range    Sodium 129 (L) 136 - 145 mmol/L    Potassium 2.3 (LL) 3.5 - 5.1 mmol/L    Chloride 92 (L) 97 - 108 mmol/L    CO2 32 21 - 32 mmol/L    Anion gap 5 5 - 15 mmol/L    Glucose 125 (H) 65 - 100 mg/dL    BUN 13 6 - 20 MG/DL    Creatinine 1.02 0.55 - 1.02 MG/DL    BUN/Creatinine ratio 13 12 - 20      GFR est AA >60 >60 ml/min/1.73m2    GFR est non-AA 54 (L) >60 ml/min/1.73m2    Calcium 9.7 8.5 - 10.1 MG/DL    Bilirubin, total 1.8 (H) 0.2 - 1.0 MG/DL    ALT (SGPT) 19 12 - 78 U/L    AST (SGOT) 20 15 - 37 U/L    Alk.  phosphatase 94 45 - 117 U/L    Protein, total 7.2 6.4 - 8.2 g/dL    Albumin 3.9 3.5 - 5.0 g/dL    Globulin 3.3 2.0 - 4.0 g/dL    A-G Ratio 1.2 1.1 - 2.2     PROTHROMBIN TIME + INR    Collection Time: 08/07/20  2:03 PM   Result Value Ref Range    INR 1.0 0.9 - 1.1      Prothrombin time 10.9 9.0 - 11.1 sec   PTT    Collection Time: 08/07/20  2:03 PM   Result Value Ref Range    aPTT 27.5 22.1 - 32.0 sec    aPTT, therapeutic range     58.0 - 77.0 SECS   TROPONIN I    Collection Time: 08/07/20  2:03 PM   Result Value Ref Range    Troponin-I, Qt. <0.05 <0.05 ng/mL   MAGNESIUM    Collection Time: 08/07/20  2:03 PM   Result Value Ref Range    Magnesium 1.3 (L) 1.6 - 2.4 mg/dL   HEMOGLOBIN A1C WITH EAG    Collection Time: 08/07/20  2:03 PM   Result Value Ref Range    Hemoglobin A1c 5.0 4.0 - 5.6 %    Est. average glucose 97 mg/dL   EKG, 12 LEAD, INITIAL    Collection Time: 08/07/20  2:13 PM   Result Value Ref Range    Ventricular Rate 84 BPM    Atrial Rate 84 BPM    P-R Interval 132 ms    QRS Duration 108 ms    Q-T Interval 448 ms    QTC Calculation (Bezet) 529 ms    Calculated P Axis 37 degrees    Calculated R Axis -27 degrees    Calculated T Axis 50 degrees    Diagnosis       Normal sinus rhythm  Possible Left atrial enlargement  Incomplete right bundle branch block  Nonspecific ST and T wave abnormality  No previous ECGs available  Confirmed by ORAL Conde ([id number]) on 8/7/2020 0:97:96 PM     SALICYLATE    Collection Time: 08/07/20  3:26 PM   Result Value Ref Range    Salicylate level <9.4 (L) 2.8 - 20.0 MG/DL   ACETAMINOPHEN    Collection Time: 08/07/20  3:26 PM   Result Value Ref Range    Acetaminophen level <2 (L) 10 - 30 ug/mL   ETHYL ALCOHOL    Collection Time: 08/07/20  3:26 PM   Result Value Ref Range    ALCOHOL(ETHYL),SERUM <10 <10 MG/DL   SAMPLES BEING HELD    Collection Time: 08/07/20  3:26 PM   Result Value Ref Range    SAMPLES BEING HELD RD     COMMENT        Add-on orders for these samples will be processed based on acceptable specimen integrity and analyte stability, which may vary by analyte.    URINALYSIS W/ RFLX MICROSCOPIC    Collection Time: 08/07/20  6:28 PM   Result Value Ref Range    Color YELLOW/STRAW      Appearance CLEAR CLEAR      Specific gravity 1.010 1.003 - 1.030      pH (UA) 7.5 5.0 - 8.0      Protein 100 (A) NEG mg/dL    Glucose Negative NEG mg/dL    Ketone 15 (A) NEG mg/dL    Bilirubin Negative NEG      Blood LARGE (A) NEG      Urobilinogen 1.0 0.2 - 1.0 EU/dL    Nitrites Negative NEG      Leukocyte Esterase SMALL (A) NEG      WBC 10-20 0 - 4 /hpf    RBC >100 (H) 0 - 5 /hpf    Epithelial cells FEW FEW /lpf    Bacteria Negative NEG /hpf    Hyaline cast 0-2 0 - 5 /lpf   AMMONIA    Collection Time: 08/07/20  9:59 PM   Result Value Ref Range    Ammonia 24 <32 UMOL/L   POTASSIUM    Collection Time: 08/07/20  9:59 PM   Result Value Ref Range    Potassium 2.2 (LL) 3.5 - 5.1 mmol/L   TROPONIN I    Collection Time: 08/07/20  9:59 PM   Result Value Ref Range    Troponin-I, Qt. <0.05 <0.05 ng/mL   MAGNESIUM    Collection Time: 08/07/20  9:59 PM   Result Value Ref Range    Magnesium 2.4 1.6 - 2.4 mg/dL   SAMPLES BEING HELD    Collection Time: 08/07/20  9:59 PM   Result Value Ref Range    SAMPLES BEING HELD  2 RED     COMMENT        Add-on orders for these samples will be processed based on acceptable specimen integrity and analyte stability, which may vary by analyte. Radiologic Studies -   CT CODE NEURO HEAD WO CONTRAST   Final Result   IMPRESSION:    Limited study secondary to motion. No large intraparenchymal or subdural   hemorrhage is identified. Evaluation for subarachnoid hemorrhage is limited. No   definite acute intracranial abnormality            CTA CODE NEURO HEAD AND NECK W CONT    (Results Pending)   CT CODE NEURO PERF W CBF    (Results Pending)   CT HEAD WO CONT    (Results Pending)     CT Results  (Last 48 hours)               08/07/20 1510  CT HEAD WO CONT Final result    Impression:  IMPRESSION:    Mild chronic small vessel ischemic disease. No acute intracranial abnormality. Narrative:  EXAM: CT HEAD WO CONT       INDICATION: code s       COMPARISON: None. CONTRAST: None. TECHNIQUE: Unenhanced CT of the head was performed using 5 mm images. Brain and   bone windows were generated. Coronal and sagittal reformats. CT dose reduction   was achieved through use of a standardized protocol tailored for this   examination and automatic exposure control for dose modulation. FINDINGS:   The ventricles and sulci are normal in size, shape and configuration. . Mild   scattered subcortical deep white matter hypodensities. . There is no intracranial   hemorrhage, extra-axial collection, or mass effect. The basilar cisterns are   open. No CT evidence of acute infarct. The bone windows demonstrate no abnormalities. Partial opacification of left   maxillary sinus. [place]            08/07/20 [address] CONT Final result    Impression:  Impression:       No acute process. No arterial thrombosis/occlusion, dissection, or flow-limiting   stenosis. Narrative:  INDICATION: possible stroke       EXAMINATION:  CT ANGIOGRAPHY HEAD AND NECK        COMPARISON: None        TECHNIQUE:  Following the uneventful administration of 100 mL of Isovue-370.,   axial CT angiography of the head and neck was performed. Delayed axial images   through the head were also obtained. Coronal and sagittal reconstructions were   obtained. Manual postprocessing of images was performed. 3-D  Sagittal maximal   intensity projection images were obtained. 3-D Coronal maximal intensity   projections were obtained. CT dose reduction was achieved through use of a   standardized protocol tailored for this examination and automatic exposure   control for dose modulation. CT brain perfusion was performed with generation of hemodynamic maps of multiple   parameters, including cerebral blood flow, cerebral blood volume, and MTT (mean   transit time). FINDINGS:       CTA NECK:       Aortic Arch: No significant abnormality. Right Common Carotid Artery: No significant abnormality. Right Internal Carotid Artery: No significant abnormality. NASCET Right: 0-25%. Left Common Carotid Artery: No significant abnormality. Left Internal Carotid Artery: No significant abnormality. NASCET Left: 0-25%. .   Carotid stenosis determined using NASCET criteria. Right Vertebral Artery: No significant abnormality. Left Vertebral Artery: No significant abnormality. Cervical Soft Tissues: Status post right thyroidectomy. Left thyroid nodules are   noted. .   Lung Apices: No significant abnormality. Bones: No destructive bone lesion. Additional Comments: N/A.       CTA HEAD:       Posterior Circulation: No flow limiting stenosis or occlusion. Anterior Circulation: No flow limiting stenosis or occlusion. Additional Comments: No evidence of aneurysm or vascular malformation. Left   maxillary sinus disease. Mild chronic small vessel ischemic disease. There are no regional areas of elevated Tmax, decreased cerebral blood flow or   blood volume. rCBF < 30% = 0 cc. Tmax > 6 seconds = 0 cc.           08/07/20 1510  CT CODE NEURO PERF W CBF Final result    Impression:  Impression:       No acute process. No arterial thrombosis/occlusion, dissection, or flow-limiting   stenosis. Narrative:  INDICATION: possible stroke       EXAMINATION:  CT ANGIOGRAPHY HEAD AND NECK        COMPARISON: None        TECHNIQUE:  Following the uneventful administration of 100 mL of Isovue-370.,   axial CT angiography of the head and neck was performed. Delayed axial images   through the head were also obtained. Coronal and sagittal reconstructions were   obtained. Manual postprocessing of images was performed. 3-D  Sagittal maximal   intensity projection images were obtained. 3-D Coronal maximal intensity   projections were obtained. CT dose reduction was achieved through use of a   standardized protocol tailored for this examination and automatic exposure   control for dose modulation. CT brain perfusion was performed with generation of hemodynamic maps of multiple   parameters, including cerebral blood flow, cerebral blood volume, and MTT (mean   transit time). FINDINGS:       CTA NECK:       Aortic Arch: No significant abnormality. Right Common Carotid Artery: No significant abnormality. Right Internal Carotid Artery: No significant abnormality. NASCET Right: 0-25%. Left Common Carotid Artery: No significant abnormality. Left Internal Carotid Artery: No significant abnormality. NASCET Left: 0-25%. .   Carotid stenosis determined using NASCET criteria. Right Vertebral Artery: No significant abnormality. Left Vertebral Artery: No significant abnormality.    Cervical Soft Tissues: Status post right thyroidectomy. Left thyroid nodules are   noted. .   Lung Apices: No significant abnormality. Bones: No destructive bone lesion. Additional Comments: N/A.       CTA HEAD:       Posterior Circulation: No flow limiting stenosis or occlusion. Anterior Circulation: No flow limiting stenosis or occlusion. Additional Comments: No evidence of aneurysm or vascular malformation. Left   maxillary sinus disease. Mild chronic small vessel ischemic disease. There are no regional areas of elevated Tmax, decreased cerebral blood flow or   blood volume. rCBF < 30% = 0 cc. Tmax > 6 seconds = 0 cc.           08/07/20 1309  CT CODE NEURO HEAD WO CONTRAST Final result    Impression:  IMPRESSION:    Limited study secondary to motion. No large intraparenchymal or subdural   hemorrhage is identified. Evaluation for subarachnoid hemorrhage is limited. No   definite acute intracranial abnormality               Narrative:  EXAM: CT CODE NEURO HEAD WO CONTRAST       INDICATION: possible level 2 stroke       COMPARISON: MRI dated 6/7/2018. CONTRAST: None. TECHNIQUE: Unenhanced CT of the head was performed using 5 mm images. Brain and   bone windows were generated. Coronal and sagittal reformats. CT dose reduction   was achieved through use of a standardized protocol tailored for this   examination and automatic exposure control for dose modulation. FINDINGS:   Motion artifact limits evaluation. The ventricles are stable in size. . No   significant white matter disease given limitations of the study. . No large   intraparenchymal hemorrhage or subdural hemorrhage is identified. Evaluation for   subtle subarachnoid hemorrhage is limited. . The basilar cisterns are open. No CT   evidence of acute infarct. The bone windows demonstrate no abnormalities. The visualized portions of the   paranasal sinuses and mastoid air cells are clear.                CXR Results  (Last 48 hours)    None            Medical Decision Making     I reviewed the vital signs, available nursing notes, past medical history, past surgical history, family history and social history. Vital Signs-Reviewed the patient's vital signs. Patient Vitals for the past 24 hrs:   Temp Pulse Resp BP SpO2   08/07/20 1350     95 %   08/07/20 1345  84 20 (!) 117/99 95 %   08/07/20 1340 98.3 °F (36.8 °C) 89 21 (!) 126/111 95 %         Records Reviewed: Nursing notes for today's visit have been reviewed. I have also reviewed most recent medical records pertinent to today's complaints, if available in our medical record system. I have also reviewed all labs and imaging results from previous results in comparison to results obtained today. If an EKG was obtained today, it has been compared to previous EKGs, if available. If arriving via EMS, the EMS report has been reviewed if made available to us within the patient's time in the emergency department. Provider Notes (Medical Decision Making):   Patient arrives via EMS for altered mental status. Given that her last known well was within 24 hours a code stroke was initiated. Patient was very agitated and unable to cooperate for a head CT which does not show any massive hemorrhage but cannot completely exclude either. Patient was uncooperative for CTA imaging. We will give her some sedative and anxiolysis. Hopefully can repeat the head CT and CTAs to rule out large vessel occlusion. Will check all labs to look for toxic or metabolic causes for her altered mental status. Family does endorse a previous episode of hypokalemia but resulted in some mental status changes however her mental status changes are quite profound therefore I would not completely place all of her symptoms today on a diagnosis of hypokalemia. Will replace any electrolytes as needed. ED Course:   Initial assessment performed.  The patients presenting problems have been discussed, and they are in agreement with the care plan formulated and outlined with them. I have encouraged them to ask questions as they arise throughout their visit. ED Course as of Aug 08 0610   [facility] Aug 07, 2020   1644 Pt CTA neg for LVO. Mag low will replace. Initially concern for CVA, will continue o watch BP, permissive HTN. Consult- Dr. Jesse Thomas, pt will be evaluated and admitted. [JH]      ED Course User Index  [JH] Mckenzie Conde,        Orders Placed This Encounter    CT CODE NEURO HEAD WO CONTRAST    CTA CODE NEURO HEAD AND NECK W CONT    CT CODE NEURO PERF W CBF    CT HEAD WO CONT    CBC WITH AUTOMATED DIFF    METABOLIC PANEL, COMPREHENSIVE    PT + INR    PTT    TROPONIN I    STROKE OVER 8 PANEL TRACKING (DO NOT DESELECT)    NURSING-MISCELLANEOUS: Notify Charge Nurse for immediate rooming.  ONE TIME    NIH STROKE SCALE ASSESSMENT(FIRST Neuro Assessment)    NURSING ASSESSMENT:  SPECIFY Neuro Checks ONE TIME STAT    NOTIFY PROVIDER: SPECIFY ONE TIME STAT    VITAL SIGNS (Per Protocol)    POC GLUCOSE    EKG, 12 LEAD, INITIAL    DYSPHAGIA SCREENING Prior to PO    SALINE LOCK IV ONE TIME STAT    ondansetron (ZOFRAN) injection 4 mg    iopamidoL (ISOVUE-370) 76 % injection 100 mL    sodium chloride (NS) flush 10 mL    DISCONTD: haloperidol lactate (HALDOL) injection 2.5 mg    haloperidol lactate (HALDOL) injection 2.5 mg    haloperidol lactate (HALDOL) injection 2.5 mg    potassium chloride 10 mEq in 100 ml IVPB       EKG    Date/Time: 8/7/2020 2:13 PM  Performed by: Rob Manriquez MD  Authorized by: Rob Manriquez MD     ECG reviewed by ED Physician in the absence of a cardiologist: yes    Rate:     ECG rate:  84    ECG rate assessment: normal    Rhythm:     Rhythm: sinus rhythm    Ectopy:     Ectopy: none    QRS:     QRS axis:  Normal    QRS intervals:  Normal  Conduction:     Conduction: abnormal      Abnormal conduction: incomplete RBBB    ST segments:     ST segments:  Non-specific  T waves:     T waves: non-specific Critical Care Time:   I have spent 35 minutes of critical care time in evaluating and treating this patient. This includes time spent at bedside, time with family and decision makers, documentation, review of labs and imaging, and/or consultation with specialists. It does not include time spent on separately billed procedures. This patient presents with a critical illness or injury that acutely impairs one or more vital organ systems such that there is a high probability of imminent or life threatening deterioration in the patient's condition. This case involved decision making of high complexity to assess, manipulate, and support vital organ system failure and/or to prevent further life threatening deterioration of the patient's condition. Failure to initiate these interventions on an urgent basis would likely result in sudden, clinically significant or life threatening deterioration in the patient's condition. Abnormal findings supporting critical care: Hypokalemia, acute neurologic change  Interventions to support critical care: Emergent stroke evaluation and potassium repletion via IV potassium  Failure to intervene may result in: Cardiac dysrhythmias, cardiac failure, death      Disposition:  Admit      Diagnosis     Clinical Impression:   1. Acute encephalopathy    2. Hypokalemia    3. Hypomagnesemia            This note will not be viewable in MyChart.

## 2020-08-07 NOTE — ROUTINE PROCESS
-Please complete MRI History and Safety Screening Form for this patient using [facility] only under Orders Requiring a Screening Form: 
 

## 2020-08-07 NOTE — ACP (ADVANCE CARE PLANNING)
[address] [facility]  Hospitalist Group                                      Advance Care Planning Note    Name: Mar Peterson  YOB: 1955  MRN: [id number]  Admission Date: 8/7/2020  1:01 PM    Date of discussion: 8/7/2020    Active Diagnoses:    Hospital Problems  Date Reviewed: 6/25/2020    None      AMS     These active diagnoses are of sufficient risk that focused discussion on advance care planning is indicated in order to allow the patient to thoughtfully consider personal goals of care, and if situations arise that prevent the ability to personally give input, to ensure appropriate representation of their personal desires for different levels and aggressiveness of care. Discussion:     Persons present and participating in discussion: Jamiramez PetersonDennys MD,     Discussion: CODE STATUS addressed and want to be    Patient would like to assign  Leon Langley  Daughter  [phone]        is a surrogate decision maker. Time Spent:     Total time spent face-to-face in education and discussion: 16 minutes.      Dennys Guerra MD  Date of Service:  8/7/2020  5:06 PM

## 2020-08-08 LAB
ALBUMIN SERPL-MCNC: 3.5 G/DL (ref 3.5–5)
ALBUMIN/GLOB SERPL: 1 {RATIO} (ref 1.1–2.2)
ALP SERPL-CCNC: 81 U/L (ref 45–117)
ALT SERPL-CCNC: 18 U/L (ref 12–78)
ANION GAP SERPL CALC-SCNC: 8 MMOL/L (ref 5–15)
AST SERPL-CCNC: 18 U/L (ref 15–37)
BASOPHILS # BLD: 0.1 K/UL (ref 0–0.1)
BASOPHILS NFR BLD: 1 % (ref 0–1)
BILIRUB SERPL-MCNC: 2.1 MG/DL (ref 0.2–1)
BUN SERPL-MCNC: 11 MG/DL (ref 6–20)
BUN/CREAT SERPL: 15 (ref 12–20)
CALCIUM SERPL-MCNC: 9.7 MG/DL (ref 8.5–10.1)
CHLORIDE SERPL-SCNC: 98 MMOL/L (ref 97–108)
CO2 SERPL-SCNC: 26 MMOL/L (ref 21–32)
CREAT SERPL-MCNC: 0.73 MG/DL (ref 0.55–1.02)
DIFFERENTIAL METHOD BLD: NORMAL
EOSINOPHIL # BLD: 0 K/UL (ref 0–0.4)
EOSINOPHIL NFR BLD: 0 % (ref 0–7)
ERYTHROCYTE [DISTWIDTH] IN BLOOD BY AUTOMATED COUNT: 13.3 % (ref 11.5–14.5)
EST. AVERAGE GLUCOSE BLD GHB EST-MCNC: 97 MG/DL
GLOBULIN SER CALC-MCNC: 3.4 G/DL (ref 2–4)
GLUCOSE SERPL-MCNC: 96 MG/DL (ref 65–100)
HBA1C MFR BLD: 5 % (ref 4–5.6)
HCT VFR BLD AUTO: 40 % (ref 35–47)
HGB BLD-MCNC: 14.2 G/DL (ref 11.5–16)
HIV 1+2 AB+HIV1 P24 AG SERPL QL IA: NONREACTIVE
HIV12 RESULT COMMENT, HHIVC: NORMAL
IMM GRANULOCYTES # BLD AUTO: 0 K/UL (ref 0–0.04)
IMM GRANULOCYTES NFR BLD AUTO: 0 % (ref 0–0.5)
LYMPHOCYTES # BLD: 1.3 K/UL (ref 0.8–3.5)
LYMPHOCYTES NFR BLD: 18 % (ref 12–49)
MCH RBC QN AUTO: 29.6 PG (ref 26–34)
MCHC RBC AUTO-ENTMCNC: 35.5 G/DL (ref 30–36.5)
MCV RBC AUTO: 83.3 FL (ref 80–99)
MONOCYTES # BLD: 0.9 K/UL (ref 0–1)
MONOCYTES NFR BLD: 12 % (ref 5–13)
NEUTS SEG # BLD: 5.2 K/UL (ref 1.8–8)
NEUTS SEG NFR BLD: 69 % (ref 32–75)
NRBC # BLD: 0 K/UL (ref 0–0.01)
NRBC BLD-RTO: 0 PER 100 WBC
PLATELET # BLD AUTO: 292 K/UL (ref 150–400)
PMV BLD AUTO: 9.8 FL (ref 8.9–12.9)
POTASSIUM SERPL-SCNC: 2.7 MMOL/L (ref 3.5–5.1)
PROT SERPL-MCNC: 6.9 G/DL (ref 6.4–8.2)
RBC # BLD AUTO: 4.8 M/UL (ref 3.8–5.2)
SODIUM SERPL-SCNC: 132 MMOL/L (ref 136–145)
TROPONIN I SERPL-MCNC: <0.05 NG/ML
VIT B12 SERPL-MCNC: 474 PG/ML (ref 193–986)
WBC # BLD AUTO: 7.4 K/UL (ref 3.6–11)

## 2020-08-08 PROCEDURE — 65660000000 HC RM CCU STEPDOWN

## 2020-08-08 PROCEDURE — 74011250637 HC RX REV CODE- 250/637: Performed by: HOSPITALIST

## 2020-08-08 PROCEDURE — 74011250636 HC RX REV CODE- 250/636: Performed by: HOSPITALIST

## 2020-08-08 PROCEDURE — 83036 HEMOGLOBIN GLYCOSYLATED A1C: CPT

## 2020-08-08 PROCEDURE — 82607 VITAMIN B-12: CPT

## 2020-08-08 PROCEDURE — 80053 COMPREHEN METABOLIC PANEL: CPT

## 2020-08-08 PROCEDURE — 84484 ASSAY OF TROPONIN QUANT: CPT

## 2020-08-08 PROCEDURE — 74011250637 HC RX REV CODE- 250/637: Performed by: GENERAL ACUTE CARE HOSPITAL

## 2020-08-08 PROCEDURE — 85025 COMPLETE CBC W/AUTO DIFF WBC: CPT

## 2020-08-08 PROCEDURE — 99223 1ST HOSP IP/OBS HIGH 75: CPT | Performed by: PSYCHIATRY & NEUROLOGY

## 2020-08-08 PROCEDURE — 97116 GAIT TRAINING THERAPY: CPT

## 2020-08-08 PROCEDURE — 97162 PT EVAL MOD COMPLEX 30 MIN: CPT

## 2020-08-08 PROCEDURE — 36415 COLL VENOUS BLD VENIPUNCTURE: CPT

## 2020-08-08 PROCEDURE — 74011250636 HC RX REV CODE- 250/636: Performed by: GENERAL ACUTE CARE HOSPITAL

## 2020-08-08 RX ORDER — LISINOPRIL 10 MG/1
10 TABLET ORAL DAILY
Status: DISCONTINUED | OUTPATIENT
Start: 2020-08-08 | End: 2020-08-09 | Stop reason: HOSPADM

## 2020-08-08 RX ORDER — POTASSIUM CHLORIDE 7.45 MG/ML
10 INJECTION INTRAVENOUS
Status: COMPLETED | OUTPATIENT
Start: 2020-08-08 | End: 2020-08-08

## 2020-08-08 RX ORDER — LISINOPRIL 10 MG/1
10 TABLET ORAL DAILY
Status: DISCONTINUED | OUTPATIENT
Start: 2020-08-09 | End: 2020-08-08

## 2020-08-08 RX ADMIN — POTASSIUM CHLORIDE 10 MEQ: 7.46 INJECTION, SOLUTION INTRAVENOUS at 01:40

## 2020-08-08 RX ADMIN — LISINOPRIL 10 MG: 10 TABLET ORAL at 12:27

## 2020-08-08 RX ADMIN — POTASSIUM CHLORIDE 10 MEQ: 7.46 INJECTION, SOLUTION INTRAVENOUS at 13:35

## 2020-08-08 RX ADMIN — POTASSIUM CHLORIDE 20 MEQ: 20 TABLET, EXTENDED RELEASE ORAL at 17:34

## 2020-08-08 RX ADMIN — HYDRALAZINE HYDROCHLORIDE 10 MG: 20 INJECTION INTRAMUSCULAR; INTRAVENOUS at 03:27

## 2020-08-08 RX ADMIN — POTASSIUM CHLORIDE 10 MEQ: 7.46 INJECTION, SOLUTION INTRAVENOUS at 12:23

## 2020-08-08 RX ADMIN — POTASSIUM CHLORIDE 10 MEQ: 7.46 INJECTION, SOLUTION INTRAVENOUS at 12:27

## 2020-08-08 RX ADMIN — POTASSIUM CHLORIDE 10 MEQ: 7.46 INJECTION, SOLUTION INTRAVENOUS at 09:10

## 2020-08-08 RX ADMIN — Medication 10 ML: at 21:17

## 2020-08-08 RX ADMIN — ONDANSETRON 4 MG: 2 INJECTION INTRAMUSCULAR; INTRAVENOUS at 04:45

## 2020-08-08 RX ADMIN — ENOXAPARIN SODIUM 40 MG: 40 INJECTION SUBCUTANEOUS at 09:10

## 2020-08-08 RX ADMIN — Medication 10 ML: at 06:00

## 2020-08-08 RX ADMIN — LEVOTHYROXINE SODIUM 88 MCG: 88 TABLET ORAL at 09:10

## 2020-08-08 RX ADMIN — POTASSIUM CHLORIDE 10 MEQ: 7.46 INJECTION, SOLUTION INTRAVENOUS at 00:38

## 2020-08-08 RX ADMIN — Medication 10 ML: at 00:05

## 2020-08-08 RX ADMIN — POTASSIUM CHLORIDE 20 MEQ: 20 TABLET, EXTENDED RELEASE ORAL at 09:10

## 2020-08-08 NOTE — PROGRESS NOTES
Bedside and Verbal shift change report given to Amarilys Jarrell (oncoming nurse) by ELISHA Sanderson RN (offgoing nurse). Report given with SBAR, Kardex, Intake/Output, MAR and Recent Results.

## 2020-08-08 NOTE — PROGRESS NOTES
Problem: Mobility Impaired (Adult and Pediatric)  Goal: *Acute Goals and Plan of Care (Insert Text)  Description: FUNCTIONAL STATUS PRIOR TO ADMISSION: Patient was independent and active without use of DME.    HOME SUPPORT PRIOR TO ADMISSION: The patient lived alone with no local support. Physical Therapy Goals  Initiated 8/8/2020  1. Patient will move from supine to sit and sit to supine , scoot up and down, and roll side to side in bed with independence within 7 day(s). 2.  Patient will transfer from bed to chair and chair to bed with independence using the least restrictive device within 7 day(s). 3.  Patient will perform sit to stand with independence within 7 day(s). 4.  Patient will ambulate with independence for 600 feet with the least restrictive device within 7 day(s). 5.  Patient will ascend/descend 12 stairs with 1 handrail(s) with modified independence within 7 day(s). Outcome: Progressing Towards Goal  PHYSICAL THERAPY EVALUATION  Patient: John Frost (72 y.o. female)  Date: 8/8/2020  Primary Diagnosis: AMS (altered mental status) [R41.82]  Hypokalemia [E87.6]  Hypomagnesemia [E83.42]        Precautions:   Fall    ASSESSMENT  Based on the objective data described below, the patient presents with generalized weakness, decreased standing balance and decreased mobility skills following admission for AMS. Today she demonstrated independence with supine to sit transfers and cg assist for bed to chair transfers. Activity tolerance was good with her walking 150 feet and going up/down 1 step 12 times. For gait and stairs she needed cg assistance only. Her gait pattern was unsteady at times with path deviations, scissoring and increased trunk sway but no gross LOB. Recommend follow up with [facility] PT/[facility] for home safety and to assist her with regaining prior level of functional independence. Current Level of Function Impacting Discharge (mobility/balance): cg    Functional Outcome Measure:   The patient scored 75/100 on the Barthel outcome measure which is indicative of 25% functional impairment. Other factors to consider for discharge: lives alone with uncertain availability of help once home     Patient will benefit from skilled therapy intervention to address the above noted impairments. PLAN :  Recommendations and Planned Interventions: bed mobility training, transfer training, gait training, therapeutic exercises, neuromuscular re-education, patient and family training/education, and therapeutic activities      Frequency/Duration: Patient will be followed by physical therapy:  4 times a week to address goals. Recommendation for discharge: (in order for the patient to meet his/her long term goals)  Physical therapy at least 2 days/week in the home AND ensure assist and/or supervision for safety with mobility and ADLs    This discharge recommendation:  Has not yet been discussed the attending provider and/or case management    IF patient discharges home will need the following DME: rolling walker       SUBJECTIVE:   Patient stated I'm ok except for my potassium is low.     OBJECTIVE DATA SUMMARY:   HISTORY:    Past Medical History:   Diagnosis Date    Arthritis     Endocrine disease     Headache     Hypertension     Memory disorder     Thyroid disease    History reviewed. No pertinent surgical history.     Personal factors and/or comorbidities impacting plan of care: lives alone, severe depression    Home Situation  Home Environment: Private residence  # Steps to Enter: 3  Rails to Enter: No(to be installed on L)  Wheelchair Ramp: No  One/Two Story Residence: Two story  # of Interior Steps: 12  Interior Rails: Left  Lift Chair Available: No  Living Alone: Yes(dog only but important to patient)  Support Systems: None  Patient Expects to be Discharged to[de-identified] Private residence  Current DME Used/Available at Home: None  Tub or Shower Type: Shower    EXAMINATION/PRESENTATION/DECISION MAKING: Critical Behavior:  Neurologic State: Alert  Orientation Level: Oriented X4  Cognition: Decreased attention/concentration, Follows commands  Safety/Judgement: Awareness of environment, Good awareness of safety precautions, Fall prevention  Hearing: Auditory  Auditory Impairment: None  Skin:  no findings  Edema: none  Range Of Motion:  AROM: Within functional limits           PROM: Within functional limits           Strength:    Strength: Within functional limits                    Tone & Sensation:   Tone: Abnormal(tremors in hands)                              Coordination:  Coordination: Within functional limits  Vision:      Functional Mobility:  Bed Mobility:  Rolling: Independent  Supine to Sit: Independent     Scooting: Independent  Transfers:  Sit to Stand: Contact guard assistance  Stand to Sit: Contact guard assistance        Bed to Chair: Contact guard assistance              Balance:   Sitting: Intact  Standing: Impaired  Standing - Static: Good  Standing - Dynamic : Fair;Occasional  Ambulation/Gait Training:  Distance (ft): 150 Feet (ft)  Assistive Device: Gait belt  Ambulation - Level of Assistance: Contact guard assistance     Gait Description (WDL): Exceptions to WDL  Gait Abnormalities: Path deviations;Scissoring;Trunk sway increased(intermittant lob)  Right Side Weight Bearing: Full  Left Side Weight Bearing: Full  Base of Support: Widened     Speed/Radha: Pace decreased (<100 feet/min)  Step Length: Right shortened;Left shortened        Interventions: Safety awareness training            Stairs:  Number of Stairs Trained: 12  Stairs - Level of Assistance: Contact guard assistance   Rail Use: Right       Functional Measure:  Barthel Index:    Bathin  Bladder: 10  Bowels: 10  Groomin  Dressin  Feeding: 10  Mobility: 10  Stairs: 5  Toilet Use: 10  Transfer (Bed to Chair and Back): 10  Total: 75/100       The Barthel ADL Index: Guidelines  1.  The index should be used as a record of what a patient does, not as a record of what a patient could do. 2. The main aim is to establish degree of independence from any help, physical or verbal, however minor and for whatever reason. 3. The need for supervision renders the patient not independent. 4. A patient's performance should be established using the best available evidence. Asking the patient, friends/relatives and nurses are the usual sources, but direct observation and common sense are also important. However direct testing is not needed. 5. Usually the patient's performance over the preceding 24-48 hours is important, but occasionally longer periods will be relevant. 6. Middle categories imply that the patient supplies over 50 per cent of the effort. 7. Use of aids to be independent is allowed. Mayra Nguyen., Barthel, D.W. (5572). Functional evaluation: the Barthel Index. [facility] (14)2. NICHOLAS Taylor, Sisi Hazel., Krishna Montejo, Maria E, [facility] (1999). Measuring the change indisability after inpatient rehabilitation; comparison of the responsiveness of the Barthel Index and Functional [place] Measure. Journal of Neurology, Neurosurgery, and Psychiatry, 66(4), 102-053. Judd Harkins, N.J.A, SHEILA Garcia.J.YESY, & Renny Beard, M.A. (2004.) Assessment of post-stroke quality of life in cost-effectiveness studies: The usefulness of the Barthel Index and the EuroQoL-5D.  Quality of Life Research, 15, 698-83        Physical Therapy Evaluation Charge Determination   History Examination Presentation Decision-Making   MEDIUM  Complexity : 1-2 comorbidities / personal factors will impact the outcome/ POC  HIGH Complexity : 4+ Standardized tests and measures addressing body structure, function, activity limitation and / or participation in recreation  MEDIUM Complexity : Evolving with changing characteristics  Other outcome measures Barthel  LOW       Based on the above components, the patient evaluation is determined to be of the following complexity level: LOW     Pain Rating:  No pain reported    Activity Tolerance:   Good and SpO2 stable on RA  Please refer to the flowsheet for vital signs taken during this treatment. After treatment patient left in no apparent distress:   Sitting in chair, Call bell within reach, and Caregiver / family present    COMMUNICATION/EDUCATION:   The patients plan of care was discussed with: Registered nurse. Fall prevention education was provided and the patient/caregiver indicated understanding., Patient/family have participated as able in goal setting and plan of care. , and Patient/family agree to work toward stated goals and plan of care.     Thank you for this referral.  Cullen Suazo, PT   Time Calculation: 29 mins

## 2020-08-08 NOTE — PROGRESS NOTES
Hospitalist Progress Note    NAME: Eldia Christie   :  1955   MRN:  [id number]       Assessment / Plan: Altered mental status - ? Resolved  Uncontrolled HTN  Etiology unclear  Possibly accidental ingestion of meds that were meant for her dog  Neurology eval pending  MRI reviewed, grossly normal but motion artifacts   CT showed mild chronic small vessel ischemic disease  CTA Head and Neck negative  Possibly also hypertensive encephalopathy? Does not appear to be on any BP meds at home - continue with PRN Hydralazine - add Lisinopril for today   recently passed away in October    Hypokalemia  Aggressively replete, currently 2.7  Repeat K at 4pm today  Pt endorses history of hypokalemia, will defer work up to PCP    Hypothyroidsim-Cont. Levothyroxine check TSH    25.0 - 29.9 Overweight / Body mass index is 26.65 kg/m². Code status: Full  Prophylaxis: Lovenox  Recommended Disposition: TBD     Subjective:     Chief Complaint / Reason for Physician Visit  Feels better today. Does not recall yesterday events well. Believes that she is back to normal.  Discussed with RN events overnight. Review of Systems:  Symptom Y/N Comments  Symptom Y/N Comments   Fever/Chills n   Chest Pain n    Poor Appetite n   Edema     Cough n   Abdominal Pain n    Sputum    Joint Pain     SOB/FRENCH n   Pruritis/Rash     Nausea/vomit n   Tolerating PT/OT     Diarrhea    Tolerating Diet y    Constipation    Other       Could NOT obtain due to:      Objective:     VITALS:   Last 24hrs VS reviewed since prior progress note.  Most recent are:  Patient Vitals for the past 24 hrs:   Temp Pulse Resp BP SpO2   20 0859 98.3 °F (36.8 °C) 91 16 167/81 98 %   20 0434    155/68    20 0238 97.6 °F (36.4 °C) 64 18 (!) 187/94 97 %   20 2351 98.9 °F (37.2 °C) 84 18 (!) 150/91 97 %   20 2219 98.7 °F (37.1 °C) 100 18 (!) 118/98 97 %   20 1815  (!) 105  (!) 199/160    20 1800  100 30 (!) 184/113 96 %   08/07/20 1745  83 25 (!) 199/111 94 %   08/07/20 1730  75 25 (!) 198/110 98 %   08/07/20 1715  91 18 (!) 177/108 94 %   08/07/20 1706 99 °F (37.2 °C)       08/07/20 1700  99 22  96 %   08/07/20 1630  97 21  98 %   08/07/20 1615  82 24 (!) 189/103 97 %   08/07/20 1600  78 23  96 %   08/07/20 1545  86 25  97 %   08/07/20 1530  83 25 (!) 211/91 97 %   08/07/20 1515  87 18 (!) 193/125 97 %   08/07/20 1445  77 21  99 %   08/07/20 1430  76 16 (!) 169/123 99 %   08/07/20 1415  81 16  97 %   08/07/20 1350     95 %   08/07/20 1345  84 20 (!) 117/99 95 %   08/07/20 1340 98.3 °F (36.8 °C) 89 21 (!) 126/111 95 %       Intake/Output Summary (Last 24 hours) at 8/8/2020 1059  Last data filed at 8/7/2020 2138  Gross per 24 hour   Intake 150 ml   Output    Net 150 ml        PHYSICAL EXAM:  General: Alert, cooperative, no acute distress    EENT:  EOMI. Anicteric sclerae. MMM  Resp:  CTA bilaterally, no wheezing or rales. No accessory muscle use  CV:  Regular  rhythm,  No edema  GI:  Soft, Non distended, Non tender.  +Bowel sounds  Neurologic:  Alert and oriented X 3, normal speech,   Psych:   Good insight. Not anxious nor agitated  Skin:  No rashes. No jaundice    Reviewed most current lab test results and cultures  YES  Reviewed most current radiology test results   YES  Review and summation of old records today    NO  Reviewed patient's current orders and MAR    YES  PMH/SH reviewed - no change compared to H&P  ________________________________________________________________________  Care Plan discussed with:    Comments   Patient x    Family      RN x    Care Manager     Consultant                        Multidiciplinary team rounds were held today with , nursing, pharmacist and clinical coordinator. Patient's plan of care was discussed; medications were reviewed and discharge planning was addressed. ________________________________________________________________________  Total NON critical care TIME:  35   Minutes    Total CRITICAL CARE TIME Spent:   Minutes non procedure based      Comments   >50% of visit spent in counseling and coordination of care     ________________________________________________________________________  Chema Solo MD     Procedures: see electronic medical records for all procedures/Xrays and details which were not copied into this note but were reviewed prior to creation of Plan. LABS:  I reviewed today's most current labs and imaging studies.   Pertinent labs include:  Recent Labs     08/08/20  0612 08/07/20  1403   WBC 7.4 10.1   HGB 14.2 14.4   HCT 40.0 40.8    300     Recent Labs     08/08/20  0612 08/07/20  2159 08/07/20  1403   *  --  129*   K 2.7* 2.2* 2.3*   CL 98  --  92*   CO2 26  --  32   GLU 96  --  125*   BUN 11  --  13   CREA 0.73  --  1.02   CA 9.7  --  9.7   MG  --  2.4 1.3*   ALB 3.5  --  3.9   TBILI 2.1*  --  1.8*   ALT 18  --  19   INR  --   --  1.0       Signed: Chema Solo MD

## 2020-08-08 NOTE — PROGRESS NOTES
Bedside and Verbal shift change report given to PETE britton (oncoming nurse) by Ko Greer RN (offgoing nurse). Report included the following information SBAR, Kardex, Intake/Output, Recent Results and Cardiac Rhythm NSR.

## 2020-08-09 VITALS
RESPIRATION RATE: 18 BRPM | HEART RATE: 88 BPM | TEMPERATURE: 98.6 F | DIASTOLIC BLOOD PRESSURE: 67 MMHG | BODY MASS INDEX: 26.65 KG/M2 | OXYGEN SATURATION: 98 % | SYSTOLIC BLOOD PRESSURE: 157 MMHG | WEIGHT: 145.72 LBS

## 2020-08-09 LAB
ANION GAP SERPL CALC-SCNC: 4 MMOL/L (ref 5–15)
BUN SERPL-MCNC: 11 MG/DL (ref 6–20)
BUN/CREAT SERPL: 17 (ref 12–20)
CALCIUM SERPL-MCNC: 9.3 MG/DL (ref 8.5–10.1)
CHLORIDE SERPL-SCNC: 100 MMOL/L (ref 97–108)
CO2 SERPL-SCNC: 27 MMOL/L (ref 21–32)
CREAT SERPL-MCNC: 0.66 MG/DL (ref 0.55–1.02)
GLUCOSE SERPL-MCNC: 98 MG/DL (ref 65–100)
MAGNESIUM SERPL-MCNC: 1.9 MG/DL (ref 1.6–2.4)
POTASSIUM SERPL-SCNC: 3.1 MMOL/L (ref 3.5–5.1)
SODIUM SERPL-SCNC: 131 MMOL/L (ref 136–145)

## 2020-08-09 PROCEDURE — 80048 BASIC METABOLIC PNL TOTAL CA: CPT

## 2020-08-09 PROCEDURE — 36415 COLL VENOUS BLD VENIPUNCTURE: CPT

## 2020-08-09 PROCEDURE — 74011250637 HC RX REV CODE- 250/637: Performed by: HOSPITALIST

## 2020-08-09 PROCEDURE — 94760 N-INVAS EAR/PLS OXIMETRY 1: CPT

## 2020-08-09 PROCEDURE — 74011250636 HC RX REV CODE- 250/636: Performed by: HOSPITALIST

## 2020-08-09 PROCEDURE — 74011250637 HC RX REV CODE- 250/637: Performed by: GENERAL ACUTE CARE HOSPITAL

## 2020-08-09 PROCEDURE — 83735 ASSAY OF MAGNESIUM: CPT

## 2020-08-09 RX ORDER — LISINOPRIL 20 MG/1
20 TABLET ORAL DAILY
Qty: 30 TAB | Refills: 1 | Status: SHIPPED | OUTPATIENT
Start: 2020-08-10

## 2020-08-09 RX ORDER — POTASSIUM CHLORIDE 750 MG/1
40 TABLET, FILM COATED, EXTENDED RELEASE ORAL
Status: COMPLETED | OUTPATIENT
Start: 2020-08-09 | End: 2020-08-09

## 2020-08-09 RX ADMIN — ACETAMINOPHEN 650 MG: 325 TABLET ORAL at 09:14

## 2020-08-09 RX ADMIN — POTASSIUM CHLORIDE 40 MEQ: 750 TABLET, FILM COATED, EXTENDED RELEASE ORAL at 09:51

## 2020-08-09 RX ADMIN — LISINOPRIL 10 MG: 10 TABLET ORAL at 09:14

## 2020-08-09 RX ADMIN — POTASSIUM CHLORIDE 20 MEQ: 20 TABLET, EXTENDED RELEASE ORAL at 09:14

## 2020-08-09 RX ADMIN — Medication 10 ML: at 06:07

## 2020-08-09 RX ADMIN — POLYETHYLENE GLYCOL 3350 17 G: 17 POWDER, FOR SOLUTION ORAL at 09:13

## 2020-08-09 RX ADMIN — LEVOTHYROXINE SODIUM 88 MCG: 88 TABLET ORAL at 09:14

## 2020-08-09 RX ADMIN — ENOXAPARIN SODIUM 40 MG: 40 INJECTION SUBCUTANEOUS at 09:13

## 2020-08-09 NOTE — DISCHARGE SUMMARY
Hospitalist Discharge Summary     Patient ID:  Blu Kearns  [id number]  76 y.o.  1955 8/7/2020    PCP on record: Tamanna Best MD    Admit date: 8/7/2020  Discharge date and time: 8/9/2020    DISCHARGE DIAGNOSIS:  See below    CONSULTATIONS:  IP CONSULT TO HOSPITALIST  IP CONSULT TO NEUROLOGY    Excerpted HPI from H&P of Gabrielle Colon MD:  Taniya Awan y.o. female  presents to the ED with cc of altered mental status.  Patient's daughter states that she was last known well at 4 PM yesterday when she last talked to her.  Reportedly according to EMS the patient called a neighbor this morning stating that she did not feel well and needed help.  EMS states there were multiple historians on scene but none of them really had a good history for her. Ryley Delacruz was very confused and disoriented. Ryley Delacruz constantly stated that she needed help.  She was not complaining of any chest pain or shortness of breath.  Reportedly she was having headache and nausea.   ______________________________________________________________________  DISCHARGE SUMMARY/HOSPITAL COURSE:  for full details see H&P, daily progress notes, labs, consult notes. Altered mental status - Resolved  Uncontrolled HTN - improved  Etiology unclear  Possibly accidental ingestion of meds that were meant for her dog  Neurology eval pending  MRI reviewed, grossly normal but motion artifacts   CT showed mild chronic small vessel ischemic disease  CTA Head and Neck negative  Possibly also hypertensive encephalopathy? Does not appear to be on any BP meds at home - continue with PRN Hydralazine - add Lisinopril for today   recently passed away in October 8/9:  Pt stable for discharge home. Will adjust BP medications.   Neurology bert appreciated - AMS felt to be possibly due to electrolyte abnormalities     Hypokalemia  Aggressively replete, currently 2.7  Repeat K at 4pm today  Pt endorses history of hypokalemia, will defer work up to PCP    8/9:  Replete      Hypothyroidsim-Cont. Levothyroxine   _______________________________________________________________________  Patient seen and examined by me on discharge day. Pertinent Findings:  Gen:    Not in distress  Chest: Clear lungs  CVS:   Regular rhythm. No edema  Abd:  Soft, not distended, not tender  Neuro:  Alert, oriented x3, no focal deficits, normal speech  _______________________________________________________________________  DISCHARGE MEDICATIONS:   Current Discharge Medication List      START taking these medications    Details   lisinopriL (PRINIVIL, ZESTRIL) 20 mg tablet Take 1 Tab by mouth daily. Qty: 30 Tab, Refills: 1         CONTINUE these medications which have NOT CHANGED    Details   amitriptyline (ELAVIL) 10 mg tablet Take 2 tabs at night  Qty: 180 Tab, Refills: 3      venlafaxine (EFFEXOR) 75 mg tablet Take 1 Tab by mouth two (2) times a day. Qty: 180 Tab, Refills: 3      butalbital-acetaminophen-caffeine (FIORICET, ESGIC) -40 mg per tablet Take 1 Tab by mouth every six (6) hours as needed for Migraine. This medication is only to be filled in one month intervals  Indications: This medication is only to be filled in one month intervals  Qty: 20 Tab, Refills: 3      venlafaxine-SR (EFFEXOR-XR) 37.5 mg capsule Take  2 p.o. nightly. Indications: major depressive disorder  Qty: 180 Cap, Refills: 3      cyanocobalamin (VITAMIN B-12) 1,000 mcg tablet Take 1,000 mcg by mouth daily. potassium chloride (K-DUR, KLOR-CON) 20 mEq tablet Take  by mouth two (2) times a day. multivitamin (ONE A DAY) tablet Take 1 Tab by mouth daily. cholecalciferol (VITAMIN D3) 1,000 unit tablet Take  by mouth two (2) times a day. oxybutynin chloride XL (DITROPAN XL) 15 mg CR tablet Take 15 mg by mouth two (2) times a day. indapamide (LOZOL) 2.5 mg tablet Take 2.5 mg by mouth daily.       levothyroxine (LEVOTHROID) 88 mcg tablet Take 88 mcg by mouth Daily (before breakfast). Patient Follow Up Instructions:    Activity: Activity as tolerated  Diet: Regular Diet  Wound Care: None needed    Follow-up Information     Follow up With Specialties Details Why Contact Info    Laurie Viviana, [address]  [place]  [phone]          ________________________________________________________________    Risk of deterioration: Low    Condition at Discharge:  Stable  __________________________________________________________________    Disposition  Home with family, no needs    ____________________________________________________________________    Code Status: Full Code  ___________________________________________________________________      Total time in minutes spent coordinating this discharge (includes going over instructions, follow-up, prescriptions, and preparing report for sign off to her PCP) :  35 minutes    Signed:  Shahab Shea MD

## 2020-08-09 NOTE — PROGRESS NOTES
Reason for Admission: AMS                    RUR Score: 10%                    Plan for utilizing home health: The patient is amendable to [facility] services, she would like to use [facility]        PCP: First and Last name: Deny Deshpande    Name of Practice:    Are you a current patient: Yes/No: Yes   Approximate date of last visit: The patient saw her PCP about 6 months ago    Can you participate in a virtual visit with your PCP: Yes                    Current Advanced Directive/Advance Care Plan:   The patient is a full code. Transition of Care Plan:                    CM met with the patient at bedside to discuss dispo plan. The patient lives by herself in a 2 level home. She is  and she has 1 daughter that lives about 30 minutes away from her. She is independent in her ADLs and IADLs. She uses no assistive device when ambulating. She is able to drive and her daughter will assist with d/c transportation. She uses [facility] for her medications. The patient has been recommended for [facility] services and the patient is agreeable. She would like to use [facility] and they have accepted the patient for [facility] services. Care Management Interventions  PCP Verified by CM: Yes(The patient saw her PCP about 6 months ago )  Mode of Transport at Discharge:  Other (see comment)(The patient's daughter will assist with d/c transportation )  Transition of Care Consult (CM Consult): Discharge Planning  MyChart Signup: No  Discharge Durable Medical Equipment: No  Physical Therapy Consult: Yes  Occupational Therapy Consult: Yes  Speech Therapy Consult: Yes  Current Support Network: Lives Alone  Confirm Follow Up Transport: Self  The Patient and/or Patient Representative was Provided with a Choice of Provider and Agrees with the Discharge Plan?: Yes  Name of the Patient Representative Who was Provided with a Choice of Provider and Agrees with the Discharge Plan: Viky Jackson  Freedom of Choice List was Provided with Basic Dialogue that Supports the Patient's Individualized Plan of Care/Goals, Treatment Preferences and Shares the Quality Data Associated with the Providers?: Yes  [employer] Information Provided?: No  Discharge Location  Discharge Placement: Home with home health    [employer]

## 2020-08-10 LAB — RPR SER QL: NONREACTIVE

## 2020-12-09 NOTE — PROGRESS NOTES
Neurology Note    Patient ID:  Lawanda Samuel  [id number]  76 y.o.  1955      Date of Consultation:  December 10, 2020    Referring Physician: Dr. Archana Drummond    Reason for Consultation: Depression    Subjective: I feel like him doing as well as I could be     Lawanda Samuel is a 76 y.o. female, evaluated via audio-only technology on 12/10/2020 for depression, cognitive concerns. 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 Side effects of those medications were reviewed. Refills of those medications were sent to her pharmacy. She currently feels that things are doing well. She is considering wanting to decrease the medications in the future pending how she is feeling. We did discuss this and how this may occur. She will notify me as things progress    The current societal events of the pandemic are not helping her in regards to her depression. Recent hospitalization for metabolic encephalopathy:  Multiple electrolyte disturbances. Discussed importance of healthy diet and taking medications to prevent electrolyte disturbance. Coronavirus pandemic:  I did discuss with the patient at length the importance of social distancing and proper hygiene especially during these times. The patient is at a higher risk of having a more severe course of the disease and needs to follow all CDC recommendations. The patient acknowledges. 12  Subjective:     Lawanda Samuel is a 76 y.o. female who returns to the neurology clinic at [facility] for an evaluation. The patient was last seen in clinic on June 25, 2020. Please see my history of present illness, examination, and treatment based plan from that day.     She was seen by prior neurologist before my initial encounter with her. She does have a history of what was believed to be a pseudodementia associated with a severe depression. Since that time, he needs to take the amitriptyline and Effexor and feels that her depression has been relatively stable. She continues to try to be engaged with friends and family while dealing with the pandemic. She tries to stay active. She is continuing to clean to her house. She does not feel that her cognition is any worse than last visit. She denies any new numbness, tingling, or weakness. she was hospitalized in August 2020 for a bout of encephalopathy. It was felt that this was related to electrolyte disturbances. Her sodium was 129, potassium 2.3, and magnesium 1.3. Those have improved. She did get home occupational therapy and physical therapy after and she did quite well with it. She does not think that her daughter has any concerns as well today. Prior to Admission medications    Medication Sig Start Date End Date Taking? Authorizing Provider   lisinopriL (PRINIVIL, ZESTRIL) 20 mg tablet Take 1 Tab by mouth daily. 8/10/20  Yes Melquiades Villarreal MD   amitriptyline (ELAVIL) 10 mg tablet Take 2 tabs at night 6/25/20  Yes Chet Shah DO   venlafaxine (EFFEXOR) 75 mg tablet Take 1 Tab by mouth two (2) times a day. 6/25/20  Yes Chet Shah DO   butalbital-acetaminophen-caffeine (FIORICET, ESGIC) -40 mg per tablet Take 1 Tab by mouth every six (6) hours as needed for Migraine. This medication is only to be filled in one month intervals  Indications: This medication is only to be filled in one month intervals 6/18/20  Yes Chet Shah DO   venlafaxine-SR (EFFEXOR-XR) 37.5 mg capsule Take  2 p.o. nightly. Indications: major depressive disorder 12/19/19  Yes Chet Shah DO   cyanocobalamin (VITAMIN B-12) 1,000 mcg tablet Take 1,000 mcg by mouth daily.    Yes Provider, Historical   potassium chloride (K-DUR, KLOR-CON) 20 mEq tablet Take  by mouth two (2) times a day. Yes Provider, Historical   multivitamin (ONE A DAY) tablet Take 1 Tab by mouth daily. Yes Provider, Historical   cholecalciferol (VITAMIN D3) 1,000 unit tablet Take  by mouth two (2) times a day. Yes Provider, Historical   oxybutynin chloride XL (DITROPAN XL) 15 mg CR tablet Take 15 mg by mouth two (2) times a day. Yes Provider, Historical   indapamide (LOZOL) 2.5 mg tablet Take 2.5 mg by mouth daily. Yes Other, MD Blanco   levothyroxine (LEVOTHROID) 88 mcg tablet Take 88 mcg by mouth Daily (before breakfast). Yes Other, MD Blanco           General, constitutional: negative  Eyes, vision: negative  Ears, nose, throat: negative  Cardiovascular, heart: negative  Respiratory: negative  Gastrointestinal: negative  Genitourinary: negative  Musculoskeletal: negative  Skin and integumentary: negative  Psychiatric: negative  Endocrine: negative  Neurological: negative, except for HPI  Hematologic/lymphatic: negative  Allergy/immunology: negative  No flowsheet data found. Ismael Manrique, who was evaluated through a patient-initiated, synchronous (real-time) audio only encounter, and/or her healthcare decision maker, is aware that it is a billable service, with coverage as determined by her insurance carrier. She provided verbal consent to proceed: yes  She has not had a related appointment within my department in the past 7 days or scheduled within the next 24 hours.       Total Time: 12 minutes    Nishant Power DO FAAN      Patient Active Problem List   Diagnosis Code    Syncope and collapse R55    Reactive depression F32.9    Mild depression ([facility] [address] F32.0    Hypokalemia E87.6    Hypomagnesemia E83.42    AMS (altered mental status) R41.82            Signed By:  Nishant Power DO FAAN    December 10, 2020

## 2020-12-10 ENCOUNTER — VIRTUAL VISIT (OUTPATIENT)
Dept: NEUROLOGY | Age: 65
End: 2020-12-10
Payer: MEDICARE

## 2020-12-10 DIAGNOSIS — F32.9 REACTIVE DEPRESSION: ICD-10-CM

## 2020-12-10 DIAGNOSIS — R41.0 DISORIENTATION: ICD-10-CM

## 2020-12-10 DIAGNOSIS — G93.40 ENCEPHALOPATHY: ICD-10-CM

## 2020-12-10 PROCEDURE — 99442 PR PHYS/QHP TELEPHONE EVALUATION 11-20 MIN: CPT | Performed by: PSYCHIATRY & NEUROLOGY

## 2020-12-10 RX ORDER — VENLAFAXINE 75 MG/1
75 TABLET ORAL 2 TIMES DAILY
Qty: 180 TAB | Refills: 3 | Status: SHIPPED | OUTPATIENT
Start: 2020-12-10 | End: 2021-08-18

## 2020-12-10 RX ORDER — AMITRIPTYLINE HYDROCHLORIDE 10 MG/1
TABLET, FILM COATED ORAL
Qty: 180 TAB | Refills: 3 | Status: SHIPPED | OUTPATIENT
Start: 2020-12-10 | End: 2021-08-18

## 2021-07-26 NOTE — PROGRESS NOTES
Neurology Note    Patient ID:  Benita Adames  [id number]  78 y.o.  1955      Date of Consultation:  July 27, 2021      Subjective: I feel that I am doing better       History of Present Illness:   Benita Adames is a 78 y.o. female who returns to the neurology clinic at [facility] for an evaluation. The patient was last seen in the clinic on 12/10/2020. Please see my history of present illness, examination, and treatment plan from that day. She was seen by prior neurologists in the past before my first encounter with her. 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 Since she was last here, she does feel that her depression has improved. She is sleeping better at night and she is not taking naps during the daytime. She wants to come off of all of her medication for depression. She has weaned down to amitriptyline 10 mg once a day at bedtime and venlafaxine 75 mg once a day. She has just recently joined a gym and has been going there 3 times a week. There has been no new numbness, tingling, or weakness. She does feel that her mental status is doing well. There has been no memory concerns in regards to paying bills, getting lost.  Occasionally she will forget where she placed something around the house but no other concerns. Past Medical History:   Diagnosis Date    Arthritis     Endocrine disease     Headache     Hypertension     Memory disorder     Thyroid disease         No past surgical history on file.      Family History   Problem Relation Age of Onset   [employer] Cancer Mother     Dementia Mother     Migraines Mother     Kidney Disease Father         Social History     Tobacco Use    Smoking status: Current Every Day Smoker     Packs/day: 0.50    Smokeless tobacco: Never Used   Substance Use Topics    Alcohol use: No        Allergies   Allergen Reactions    Sulfa (Sulfonamide Antibiotics) Nausea Only Prior to Admission medications    Medication Sig Start Date End Date Taking? Authorizing Provider   amLODIPine (NORVASC) 2.5 mg tablet Take  by mouth daily. Yes Provider, Historical   amitriptyline (ELAVIL) 10 mg tablet Take 2 tabs at night 12/10/20  Yes Chet Shah DO   venlafaxine (EFFEXOR) 75 mg tablet Take 1 Tab by mouth two (2) times a day. 12/10/20  Yes Chet Shah DO   lisinopriL (PRINIVIL, ZESTRIL) 20 mg tablet Take 1 Tab by mouth daily. 8/10/20  Yes Car Garcia MD   cyanocobalamin (VITAMIN B-12) 1,000 mcg tablet Take 1,000 mcg by mouth daily. Yes Provider, Historical   potassium chloride (K-DUR, KLOR-CON) 20 mEq tablet Take  by mouth two (2) times a day. Yes Provider, Historical   multivitamin (ONE A DAY) tablet Take 1 Tab by mouth daily. Yes Provider, Historical   cholecalciferol (VITAMIN D3) 1,000 unit tablet Take  by mouth two (2) times a day. Yes Provider, Historical   oxybutynin chloride XL (DITROPAN XL) 15 mg CR tablet Take 15 mg by mouth two (2) times a day. Yes Provider, Historical   indapamide (LOZOL) 2.5 mg tablet Take 2.5 mg by mouth daily. Yes Other, MD Blanco   levothyroxine (LEVOTHROID) 88 mcg tablet Take 88 mcg by mouth Daily (before breakfast). Yes Other, MD Blanco   butalbital-acetaminophen-caffeine (FIORICET, ESGIC) -40 mg per tablet Take 1 Tab by mouth every six (6) hours as needed for Migraine. This medication is only to be filled in one month intervals  Indications:  This medication is only to be filled in one month intervals  Patient not taking: Reported on 7/27/2021 6/18/20   Daphne NUNO DO       Review of Systems:    General, constitutional: negative  Eyes, vision: negative  Ears, nose, throat: negative  Cardiovascular, heart: negative  Respiratory: negative  Gastrointestinal: negative  Genitourinary: negative  Musculoskeletal: negative  Skin and integumentary: negative  Psychiatric: dePression and anxiety  Endocrine: negative  Neurological: negative, except for HPI  Hematologic/lymphatic: negative  Allergy/immunology: negative      Objective:     Visit Vitals  /68 (BP 1 Location: Right arm, BP Patient Position: Sitting, BP Cuff Size: Adult)   Pulse 76   Resp 16   Wt 155 lb (70.3 kg)   SpO2 96%   BMI 28.35 kg/m²       Physical Exam:      General:  appears well nourished in no acute distress  Neck: no carotid bruits  Lungs: clear to auscultation  Heart:  no murmurs, regular rate  Lower extremity: peripheral pulses palpable and no edema  Skin: intact    Neurological exam:    Awake, alert, oriented to person, place and time  Recent and remote memory were normal  Attention and concentration were intact  Language was intact. There was no aphasia  Speech: no dysarthria  Fund of knowledge was preserved    Cranial nerves:   II-XII were tested    Perrrla  Visual fields were full  Eomi, no evidence of nystagmus  Facial sensation:  normal and symmetric  Facial motor: normal and symmetric  Hearing intact  SCM strength intact  Tongue: midline without fasciculations    Motor: Tone normal    No evidence of fasciculations    Strength testing:   deltoid triceps biceps Wrist ext. Wrist flex. intrinsics Hip flex. Hip ext. Knee ext. Knee flex Dorsi flex Plantar flex   Right 5 5 5 5 5 5 5 5 5 5 5 5   Left 5 5 5 5 5 5 5 5 5 5 5 5         Sensory:  Upper extremity: intact to pp, light touch, and vibration > 10 seconds  Lower extremity: Pinprick is only minimally decreased on the dorsum of her toes. Vibration was 10 seconds. unchanged    Reflexes:    Right Left  Biceps  2 2  Triceps 2 2  Brachiorad. 2 2  Patella  2 2  Achilles 2 2    Plantar response:  flexor bilaterally      Cerebellar testing:  no tremor apparent, finger/nose and drake were intact    Romberg: absent    Gait: steady.      Labs:     Lab Results   Component Value Date/Time    Hemoglobin A1c 5.0 08/08/2020 06:12 AM    Sodium 131 (L) 08/09/2020 02:44 AM    Potassium 3.1 (L) 08/09/2020 02:44 AM    Chloride 100 08/09/2020 02:44 AM    Glucose 98 08/09/2020 02:44 AM    BUN 11 08/09/2020 02:44 AM    Creatinine 0.66 08/09/2020 02:44 AM    Calcium 9.3 08/09/2020 02:44 AM    WBC 7.4 08/08/2020 06:12 AM    HCT 40.0 08/08/2020 06:12 AM    HGB 14.2 08/08/2020 06:12 AM    PLATELET 257 61/55/6729 06:12 AM       Imaging:    Results from Hospital Encounter encounter on 08/07/20    MRI BRAIN WO CONT    Narrative  EXAM: MRI BRAIN WO CONT    INDICATION: AMS    COMPARISON: CT from earlier the same day. CONTRAST: None. TECHNIQUE:  Multiplanar multisequence acquisition without contrast of the brain. FINDINGS:  Study is extremely limited by patient motion. On limited imaging was performed. The diffusion images are relatively interpretable demonstrate no evidence of  diffusion obstruction. T2-weighted images are suboptimal but grossly normal.  Other findings include left maxillary sinus opacification. Impression  IMPRESSION:  Suboptimal study secondary to extensive motion. Diffusion-weighted imaging is  grossly normal. Remainder of the sequences are limited. Results from [place] encounter on 08/07/20    CT HEAD WO CONT    Narrative  EXAM: CT HEAD WO CONT    INDICATION: code s    COMPARISON: None. CONTRAST: None. TECHNIQUE: Unenhanced CT of the head was performed using 5 mm images. Brain and  bone windows were generated. Coronal and sagittal reformats. CT dose reduction  was achieved through use of a standardized protocol tailored for this  examination and automatic exposure control for dose modulation. FINDINGS:  The ventricles and sulci are normal in size, shape and configuration. . Mild  scattered subcortical deep white matter hypodensities. . There is no intracranial  hemorrhage, extra-axial collection, or mass effect. The basilar cisterns are  open. No CT evidence of acute infarct. The bone windows demonstrate no abnormalities.  Partial opacification of left  maxillary sinus..    Impression  IMPRESSION:  Mild chronic small vessel ischemic disease. No acute intracranial abnormality. Assessment and Plan:   The patient is a pleasant 69-year-old female who has been followed in the neurology clinic previously for a pseudodementia associated with depression who has a new superimposed situational depression after the passing away of her . This has continued to improve. Situational depression superimposed on her underlying depression.:  She will continue with her amitriptyline and venlafaxine. We discussed slowly tapering off those medications at the patient's wishes. I would first start with the amitriptyline. She has already weaned herself down to 10 mg a day. She can stop this medication at that dose. I do not want her to do any titration of the venlafaxine for at least 2 weeks. If she has increasing difficulties with sleep or worsening of depression she will return back to the 10 mg. She is already cut her venlafaxine from twice a day to once a day. She can then at once a day stop this medication. I did mention to her that if she notices worsening of depression that she will need to go back up on the dose. She will keep me updated on how things are going. I commended her on her joining a gym and her exercise which is very positive. Coronavirus pandemic:   she Received her vaccine      Patient Active Problem List   Diagnosis Code    Syncope and collapse R55    Reactive depression F32.9    Mild depression ([facility] [address] F32.0    Hypokalemia E87.6    Hypomagnesemia E83.42    AMS (altered mental status) R41.82               The patient should return to clinic in 9 months. Renewed medication: yes, side effects reviewed.      I spent   30  minutes on the day of the encounter preparing the office visit by reviewing medical records, obtaining a history, performing examination, counseling and educating the patient on diagnosis,  documenting in the clinical medical record, and coordinating the care for the patient. The patient had the ability to ask questions and all questions were answered.           Signed By:  Georgiana Yanez DO FAAN    July 27, 2021

## 2021-07-27 ENCOUNTER — OFFICE VISIT (OUTPATIENT)
Dept: NEUROLOGY | Age: 66
End: 2021-07-27
Payer: MEDICARE

## 2021-07-27 VITALS
SYSTOLIC BLOOD PRESSURE: 126 MMHG | OXYGEN SATURATION: 96 % | HEART RATE: 76 BPM | RESPIRATION RATE: 16 BRPM | DIASTOLIC BLOOD PRESSURE: 68 MMHG | BODY MASS INDEX: 28.35 KG/M2 | WEIGHT: 155 LBS

## 2021-07-27 DIAGNOSIS — F32.4 MAJOR DEPRESSIVE DISORDER WITH SINGLE EPISODE, IN PARTIAL REMISSION (HCC): Primary | ICD-10-CM

## 2021-07-27 DIAGNOSIS — G31.84 MCI (MILD COGNITIVE IMPAIRMENT): ICD-10-CM

## 2021-07-27 PROCEDURE — G8427 DOCREV CUR MEDS BY ELIG CLIN: HCPCS | Performed by: PSYCHIATRY & NEUROLOGY

## 2021-07-27 PROCEDURE — 1101F PT FALLS ASSESS-DOCD LE1/YR: CPT | Performed by: PSYCHIATRY & NEUROLOGY

## 2021-07-27 PROCEDURE — G8536 NO DOC ELDER MAL SCRN: HCPCS | Performed by: PSYCHIATRY & NEUROLOGY

## 2021-07-27 PROCEDURE — G8400 PT W/DXA NO RESULTS DOC: HCPCS | Performed by: PSYCHIATRY & NEUROLOGY

## 2021-07-27 PROCEDURE — G8419 CALC BMI OUT NRM PARAM NOF/U: HCPCS | Performed by: PSYCHIATRY & NEUROLOGY

## 2021-07-27 PROCEDURE — 1090F PRES/ABSN URINE INCON ASSESS: CPT | Performed by: PSYCHIATRY & NEUROLOGY

## 2021-07-27 PROCEDURE — 99214 OFFICE O/P EST MOD 30 MIN: CPT | Performed by: PSYCHIATRY & NEUROLOGY

## 2021-07-27 PROCEDURE — 3017F COLORECTAL CA SCREEN DOC REV: CPT | Performed by: PSYCHIATRY & NEUROLOGY

## 2021-07-27 PROCEDURE — G9717 DOC PT DX DEP/BP F/U NT REQ: HCPCS | Performed by: PSYCHIATRY & NEUROLOGY

## 2021-07-27 RX ORDER — AMLODIPINE BESYLATE 2.5 MG/1
TABLET ORAL DAILY
COMMUNITY

## 2021-08-18 ENCOUNTER — APPOINTMENT (OUTPATIENT)
Dept: GENERAL RADIOLOGY | Age: 66
End: 2021-08-18
Attending: STUDENT IN AN ORGANIZED HEALTH CARE EDUCATION/TRAINING PROGRAM
Payer: MEDICARE

## 2021-08-18 ENCOUNTER — HOSPITAL ENCOUNTER (EMERGENCY)
Age: 66
Discharge: HOME | End: 2021-08-18
Attending: EMERGENCY MEDICINE
Payer: MEDICARE

## 2021-08-18 VITALS
SYSTOLIC BLOOD PRESSURE: 160 MMHG | OXYGEN SATURATION: 100 % | TEMPERATURE: 98 F | BODY MASS INDEX: 29.58 KG/M2 | HEIGHT: 62 IN | HEART RATE: 76 BPM | WEIGHT: 160.72 LBS | DIASTOLIC BLOOD PRESSURE: 73 MMHG | RESPIRATION RATE: 18 BRPM

## 2021-08-18 DIAGNOSIS — R06.02 SOB (SHORTNESS OF BREATH): ICD-10-CM

## 2021-08-18 DIAGNOSIS — U07.1 COVID-19 VIRUS INFECTION: Primary | ICD-10-CM

## 2021-08-18 LAB
ALBUMIN SERPL-MCNC: 3.6 G/DL (ref 3.5–5)
ALBUMIN/GLOB SERPL: 1.1 {RATIO} (ref 1.1–2.2)
ALP SERPL-CCNC: 83 U/L (ref 45–117)
ALT SERPL-CCNC: 21 U/L (ref 12–78)
ANION GAP SERPL CALC-SCNC: 7 MMOL/L (ref 5–15)
AST SERPL-CCNC: 28 U/L (ref 15–37)
ATRIAL RATE: 83 BPM
BASOPHILS # BLD: 0.1 K/UL (ref 0–0.1)
BASOPHILS NFR BLD: 1 % (ref 0–1)
BILIRUB SERPL-MCNC: 0.8 MG/DL (ref 0.2–1)
BNP SERPL-MCNC: 629 PG/ML
BUN SERPL-MCNC: 13 MG/DL (ref 6–20)
BUN/CREAT SERPL: 18 (ref 12–20)
CALCIUM SERPL-MCNC: 9.9 MG/DL (ref 8.5–10.1)
CALCULATED P AXIS, ECG09: 57 DEGREES
CALCULATED R AXIS, ECG10: -44 DEGREES
CALCULATED T AXIS, ECG11: 94 DEGREES
CHLORIDE SERPL-SCNC: 99 MMOL/L (ref 97–108)
CK SERPL-CCNC: 62 U/L (ref 26–192)
CO2 SERPL-SCNC: 25 MMOL/L (ref 21–32)
CREAT SERPL-MCNC: 0.72 MG/DL (ref 0.55–1.02)
DIAGNOSIS, 93000: NORMAL
DIFFERENTIAL METHOD BLD: ABNORMAL
EOSINOPHIL # BLD: 0 K/UL (ref 0–0.4)
EOSINOPHIL NFR BLD: 0 % (ref 0–7)
ERYTHROCYTE [DISTWIDTH] IN BLOOD BY AUTOMATED COUNT: 13.5 % (ref 11.5–14.5)
GLOBULIN SER CALC-MCNC: 3.3 G/DL (ref 2–4)
GLUCOSE SERPL-MCNC: 117 MG/DL (ref 65–100)
HCT VFR BLD AUTO: 39.2 % (ref 35–47)
HGB BLD-MCNC: 13.7 G/DL (ref 11.5–16)
IMM GRANULOCYTES # BLD AUTO: 0 K/UL (ref 0–0.04)
IMM GRANULOCYTES NFR BLD AUTO: 0 % (ref 0–0.5)
LYMPHOCYTES # BLD: 1.2 K/UL (ref 0.8–3.5)
LYMPHOCYTES NFR BLD: 9 % (ref 12–49)
MCH RBC QN AUTO: 30 PG (ref 26–34)
MCHC RBC AUTO-ENTMCNC: 34.9 G/DL (ref 30–36.5)
MCV RBC AUTO: 86 FL (ref 80–99)
MONOCYTES # BLD: 0.5 K/UL (ref 0–1)
MONOCYTES NFR BLD: 4 % (ref 5–13)
NEUTS SEG # BLD: 11.2 K/UL (ref 1.8–8)
NEUTS SEG NFR BLD: 86 % (ref 32–75)
NRBC # BLD: 0 K/UL (ref 0–0.01)
NRBC BLD-RTO: 0 PER 100 WBC
P-R INTERVAL, ECG05: 138 MS
PLATELET # BLD AUTO: 320 K/UL (ref 150–400)
PMV BLD AUTO: 9.7 FL (ref 8.9–12.9)
POTASSIUM SERPL-SCNC: 3.8 MMOL/L (ref 3.5–5.1)
PROT SERPL-MCNC: 6.9 G/DL (ref 6.4–8.2)
Q-T INTERVAL, ECG07: 408 MS
QRS DURATION, ECG06: 112 MS
QTC CALCULATION (BEZET), ECG08: 479 MS
RBC # BLD AUTO: 4.56 M/UL (ref 3.8–5.2)
SODIUM SERPL-SCNC: 131 MMOL/L (ref 136–145)
TROPONIN I SERPL-MCNC: <0.05 NG/ML
VENTRICULAR RATE, ECG03: 83 BPM
WBC # BLD AUTO: 12.9 K/UL (ref 3.6–11)

## 2021-08-18 PROCEDURE — 99284 EMERGENCY DEPT VISIT MOD MDM: CPT

## 2021-08-18 PROCEDURE — 71046 X-RAY EXAM CHEST 2 VIEWS: CPT

## 2021-08-18 PROCEDURE — 82550 ASSAY OF CK (CPK): CPT

## 2021-08-18 PROCEDURE — 93005 ELECTROCARDIOGRAM TRACING: CPT

## 2021-08-18 PROCEDURE — 36415 COLL VENOUS BLD VENIPUNCTURE: CPT

## 2021-08-18 PROCEDURE — 80053 COMPREHEN METABOLIC PANEL: CPT

## 2021-08-18 PROCEDURE — 84484 ASSAY OF TROPONIN QUANT: CPT

## 2021-08-18 PROCEDURE — 83880 ASSAY OF NATRIURETIC PEPTIDE: CPT

## 2021-08-18 PROCEDURE — 85025 COMPLETE CBC W/AUTO DIFF WBC: CPT

## 2021-08-18 RX ORDER — ALBUTEROL SULFATE 90 UG/1
2 AEROSOL, METERED RESPIRATORY (INHALATION)
Qty: 1 INHALER | Refills: 0 | Status: SHIPPED | OUTPATIENT
Start: 2021-08-18

## 2021-08-18 RX ORDER — BENZONATATE 100 MG/1
100 CAPSULE ORAL
Qty: 30 CAPSULE | Refills: 0 | Status: SHIPPED | OUTPATIENT
Start: 2021-08-18 | End: 2021-08-25

## 2021-08-18 NOTE — ED NOTES
Patient alert skin warm dry pink amb with steady even gait to room 37 states tested positive for covid yesterday still with cough, chills and body aches NAD- sat's 100% room air. Changed to gown, call bell in reach    1200 assumed care of pt from triage. Pt is A+Ox4. Resting on stretcher with no complaints at this time. Pt ambulated with pulse ox in room per Dr Radha Aaron. Pt sats remained at 100%, no dizziness, weakness, or SOB on exertion. Reported these results to MD. Pt on monitor x2, call light in reach.

## 2021-08-18 NOTE — ED PROVIDER NOTES
EMERGENCY DEPARTMENT HISTORY AND PHYSICAL EXAM      Date: 8/18/2021  Patient Name: Socorro Enamorado    History of Presenting Illness     Chief Complaint   Patient presents with    Positive For Covid-19     [facility] and received call today she is positive for covid. Pt was fully vaccinated. Pt reports she has fever/ cough/ and SOB. Pt ambulatry to triage without difficulty. History Provided By: Patient    HPI: Socorro Enamorado, 78 y.o. female presents to the ED with cc of COVID-19, cough and shortness of breath. The patient symptoms started a week and a half ago. [facility] and was told today that her Covid result was positive. Has had low-grade fevers cough productive of clear sputum and shortness of breath. She denies chest pain, leg pain, leg edema. She has had occasional headaches, but denies neck pain. She denies abdominal pain, dysuria or diarrhea. She was fully vaccinated against COVID-19 and March    There are no other complaints, changes, or physical findings at this time. PCP: Michael Ayala MD    No current facility-administered medications on file prior to encounter. Current Outpatient Medications on File Prior to Encounter   Medication Sig Dispense Refill    amLODIPine (NORVASC) 2.5 mg tablet Take  by mouth daily.  lisinopriL (PRINIVIL, ZESTRIL) 20 mg tablet Take 1 Tab by mouth daily. 30 Tab 1    cyanocobalamin (VITAMIN B-12) 1,000 mcg tablet Take 1,000 mcg by mouth daily.  potassium chloride (K-DUR, KLOR-CON) 20 mEq tablet Take  by mouth two (2) times a day.  multivitamin (ONE A DAY) tablet Take 1 Tab by mouth daily.  cholecalciferol (VITAMIN D3) 1,000 unit tablet Take  by mouth two (2) times a day.  indapamide (LOZOL) 2.5 mg tablet Take 2.5 mg by mouth daily.  levothyroxine (LEVOTHROID) 88 mcg tablet Take 88 mcg by mouth Daily (before breakfast).       [DISCONTINUED] amitriptyline (ELAVIL) 10 mg tablet Take 2 tabs at night 180 Tab 3    [DISCONTINUED] venlafaxine (EFFEXOR) 75 mg tablet Take 1 Tab by mouth two (2) times a day. 180 Tab 3    [DISCONTINUED] butalbital-acetaminophen-caffeine (FIORICET, ESGIC) -40 mg per tablet Take 1 Tab by mouth every six (6) hours as needed for Migraine. This medication is only to be filled in one month intervals  Indications: This medication is only to be filled in one month intervals (Patient not taking: Reported on 7/27/2021) 20 Tab 3    [DISCONTINUED] oxybutynin chloride XL (DITROPAN XL) 15 mg CR tablet Take 15 mg by mouth two (2) times a day. Past History     Past Medical History:  Past Medical History:   Diagnosis Date    Arthritis     Endocrine disease     Headache     Hypertension     Memory disorder     Thyroid disease        Past Surgical History:  History reviewed. No pertinent surgical history. Family History:  Family History   Problem Relation Age of Onset   Zoran Med[place]in Cancer Mother     Dementia Mother     Migraines Mother     Kidney Disease Father        Social History:  Social History     Tobacco Use    Smoking status: Current Every Day Smoker     Packs/day: 0.00     Years: 38.00     Pack years: 0.00    Smokeless tobacco: Never Used    Tobacco comment: 2 cigarettes a day   Vaping Use    Vaping Use: Never used   Substance Use Topics    Alcohol use: Yes     Comment: weekends    Drug use: No       Allergies: Allergies   Allergen Reactions    Sulfa (Sulfonamide Antibiotics) Nausea Only         Review of Systems   Review of Systems   Constitutional: Positive for fever. Eyes: Negative. Respiratory: Positive for cough and shortness of breath. Cardiovascular: Negative for chest pain. Gastrointestinal: Negative for abdominal pain and diarrhea. Endocrine: Negative for heat intolerance. Genitourinary: Negative. Musculoskeletal: Negative for back pain. Skin: Negative for rash.    Allergic/Immunologic: Negative for immunocompromised state. Neurological: Negative for dizziness. Hematological: Does not bruise/bleed easily. Psychiatric/Behavioral: Negative. All other systems reviewed and are negative. Physical Exam   Physical Exam  Vitals and nursing note reviewed. Constitutional:       General: She is not in acute distress. Appearance: She is well-developed. HENT:      Head: Normocephalic. Cardiovascular:      Rate and Rhythm: Normal rate and regular rhythm. Pulses: Normal pulses. Heart sounds: Normal heart sounds. Pulmonary:      Effort: Pulmonary effort is normal.      Breath sounds: Normal breath sounds. Abdominal:      General: Bowel sounds are normal.      Palpations: Abdomen is soft. Tenderness: There is no abdominal tenderness. Musculoskeletal:         General: Normal range of motion. Cervical back: Normal range of motion and neck supple. Skin:     General: Skin is warm and dry. Neurological:      General: No focal deficit present. Mental Status: She is alert and oriented to person, place, and time.    Psychiatric:         Mood and Affect: Mood normal.         Behavior: Behavior normal.         Diagnostic Study Results     Labs -     Recent Results (from the past 12 hour(s))   EKG, 12 LEAD, INITIAL    Collection Time: 08/18/21  9:38 AM   Result Value Ref Range    Ventricular Rate 83 BPM    Atrial Rate 83 BPM    P-R Interval 138 ms    QRS Duration 112 ms    Q-T Interval 408 ms    QTC Calculation (Bezet) 479 ms    Calculated P Axis 57 degrees    Calculated R Axis -44 degrees    Calculated T Axis 94 degrees    Diagnosis       Normal sinus rhythm  Possible Left atrial enlargement  Left axis deviation  Incomplete right bundle branch block  When compared with ECG of 07-AUG-2020 14:13,  No significant change was found  Confirmed by Claude Oman MD, Charles Russell ([id number]) on 8/18/2021 10:15:01 AM     CBC WITH AUTOMATED DIFF    Collection Time: 08/18/21  9:46 AM   Result Value Ref Range WBC 12.9 (H) 3.6 - 11.0 K/uL    RBC 4.56 3.80 - 5.20 M/uL    HGB 13.7 11.5 - 16.0 g/dL    HCT 39.2 35.0 - 47.0 %    MCV 86.0 80.0 - 99.0 FL    MCH 30.0 26.0 - 34.0 PG    MCHC 34.9 30.0 - 36.5 g/dL    RDW 13.5 11.5 - 14.5 %    PLATELET 923 309 - 528 K/uL    MPV 9.7 8.9 - 12.9 FL    NRBC 0.0 0  WBC    ABSOLUTE NRBC 0.00 0.00 - 0.01 K/uL    NEUTROPHILS 86 (H) 32 - 75 %    LYMPHOCYTES 9 (L) 12 - 49 %    MONOCYTES 4 (L) 5 - 13 %    EOSINOPHILS 0 0 - 7 %    BASOPHILS 1 0 - 1 %    IMMATURE GRANULOCYTES 0 0.0 - 0.5 %    ABS. NEUTROPHILS 11.2 (H) 1.8 - 8.0 K/UL    ABS. LYMPHOCYTES 1.2 0.8 - 3.5 K/UL    ABS. MONOCYTES 0.5 0.0 - 1.0 K/UL    ABS. EOSINOPHILS 0.0 0.0 - 0.4 K/UL    ABS. BASOPHILS 0.1 0.0 - 0.1 K/UL    ABS. IMM. GRANS. 0.0 0.00 - 0.04 K/UL    DF AUTOMATED     NT-PRO BNP    Collection Time: 08/18/21 10:25 AM   Result Value Ref Range    NT pro- (H) <125 PG/ML   TROPONIN I    Collection Time: 08/18/21 10:25 AM   Result Value Ref Range    Troponin-I, Qt. <0.05 <0.05 ng/mL   CK W/ REFLX CKMB    Collection Time: 08/18/21 10:25 AM   Result Value Ref Range    CK 62 26 - 854 U/L   METABOLIC PANEL, COMPREHENSIVE    Collection Time: 08/18/21 10:25 AM   Result Value Ref Range    Sodium 131 (L) 136 - 145 mmol/L    Potassium 3.8 3.5 - 5.1 mmol/L    Chloride 99 97 - 108 mmol/L    CO2 25 21 - 32 mmol/L    Anion gap 7 5 - 15 mmol/L    Glucose 117 (H) 65 - 100 mg/dL    BUN 13 6 - 20 MG/DL    Creatinine 0.72 0.55 - 1.02 MG/DL    BUN/Creatinine ratio 18 12 - 20      GFR est AA >60 >60 ml/min/1.73m2    GFR est non-AA >60 >60 ml/min/1.73m2    Calcium 9.9 8.5 - 10.1 MG/DL    Bilirubin, total 0.8 0.2 - 1.0 MG/DL    ALT (SGPT) 21 12 - 78 U/L    AST (SGOT) 28 15 - 37 U/L    Alk. phosphatase 83 45 - 117 U/L    Protein, total 6.9 6.4 - 8.2 g/dL    Albumin 3.6 3.5 - 5.0 g/dL    Globulin 3.3 2.0 - 4.0 g/dL    A-G Ratio 1.1 1.1 - 2.2         Radiologic Studies -   XR CHEST PA LAT   Final Result   1.  No radiographic evidence of acute cardiopulmonary disease. CT Results  (Last 48 hours)    None        CXR Results  (Last 48 hours)               08/18/21 1013  XR CHEST PA LAT Final result    Impression:  1. No radiographic evidence of acute cardiopulmonary disease. Narrative:  INDICATION: . Shortness of Breath   Additional history:   COMPARISON: Previous chest xray, 8/7/2020. [place] FINDINGS: PA and lateral view of the chest.    .   Lines/tubes/surgical: None. Heart/mediastinum: Unremarkable. Lungs/pleura:  No focal consolidation or mass. No visualized pleural effusion or   pneumothorax. Additional Comments: Levoscoliosis of the lumbar spine. .    . Medical Decision Making   I am the first provider for this patient. I reviewed the vital signs, available nursing notes, past medical history, past surgical history, family history and social history. Vital Signs-Reviewed the patient's vital signs. Patient Vitals for the past 12 hrs:   Temp Pulse Resp BP SpO2   08/18/21 1222 -- -- -- -- 100 %   08/18/21 1100 -- -- -- (!) 160/73 98 %   08/18/21 1046 -- 76 18 -- 100 %   08/18/21 0932 98 °F (36.7 °C) 88 18 (!) 186/111 100 %       EKG interpretation: (Preliminary)  Rhythm: normal sinus rhythm; and regular . Rate (approx.): 83; Axis: left axis deviation; GA interval: normal; QRS interval: normal ; ST/T wave: non-specific changes; Other findings: Incomplete right bundle branch block, no significant change. Records Reviewed: Nursing Notes, Old Medical Records, Previous electrocardiograms, Previous Radiology Studies and Previous Laboratory Studies    Provider Notes (Medical Decision Making):   Covid 19 infection, pneumonia, bronchitis, ACS, CHF,    ED Course:   Initial assessment performed. The patients presenting problems have been discussed, and they are in agreement with the care plan formulated and outlined with them.   I have encouraged them to ask questions as they arise throughout their visit.    Progress note:    Patient was ambulated for pulse ox and maintain normal saturations. Results of been reviewed. She is advised to follow-up and return to ER if worse             Critical Care Time:   0    Disposition:  home    DISCHARGE PLAN:  1. Discharge Medication List as of 8/18/2021 12:32 PM      START taking these medications    Details   benzonatate (Tessalon Perles) 100 mg capsule Take 1 Capsule by mouth three (3) times daily as needed for Cough for up to 7 days. , Normal, Disp-30 Capsule, R-0      albuterol (PROVENTIL HFA, VENTOLIN HFA, PROAIR HFA) 90 mcg/actuation inhaler Take 2 Puffs by inhalation every four (4) hours as needed for Wheezing., Normal, Disp-1 Inhaler, R-0         CONTINUE these medications which have NOT CHANGED    Details   amLODIPine (NORVASC) 2.5 mg tablet Take  by mouth daily. , Historical Med      lisinopriL (PRINIVIL, ZESTRIL) 20 mg tablet Take 1 Tab by mouth daily. , Normal, Disp-30 Tab,R-1      cyanocobalamin (VITAMIN B-12) 1,000 mcg tablet Take 1,000 mcg by mouth daily. , Historical Med      potassium chloride (K-DUR, KLOR-CON) 20 mEq tablet Take  by mouth two (2) times a day., Historical Med      multivitamin (ONE A DAY) tablet Take 1 Tab by mouth daily. , Historical Med      cholecalciferol (VITAMIN D3) 1,000 unit tablet Take  by mouth two (2) times a day., Historical Med      indapamide (LOZOL) 2.5 mg tablet Take 2.5 mg by mouth daily. , Historical Med      levothyroxine (LEVOTHROID) 88 mcg tablet Take 88 mcg by mouth Daily (before breakfast). , Historical Med           2. Follow-up Information     Follow up With Specialties Details Why Contact Info    Berna Cockayne, MD Family Medicine  As needed [address] [address]. [phone]      [facility] EMERGENCY DEPT Emergency Medicine  If symptoms worsen [address] [address] [address] [facility]  [phone]        3. Return to ED if worse     Diagnosis     Clinical Impression:   1.  COVID-19 virus infection    2. SOB (shortness of breath)        Attestations:    Mary Apodaca MD    Please note that this dictation was completed with [employer], the computer voice recognition software. Quite often unanticipated grammatical, syntax, homophones, and other interpretive errors are inadvertently transcribed by the computer software. Please disregard these errors. Please excuse any errors that have escaped final proofreading. Thank you.

## 2021-08-19 ENCOUNTER — PATIENT OUTREACH (OUTPATIENT)
Dept: CASE MANAGEMENT | Age: 66
End: 2021-08-19

## 2021-08-19 NOTE — PROGRESS NOTES
Patient contacted regarding COVID-19 diagnosis. Discussed COVID-19 related testing which was done prior to the ED visit. LPN Care Coordinator contacted the patient by telephone to perform post discharge assessment. Call within 2 business days of discharge: Yes Verified name and  with patient as identifiers. Provided introduction to self, and explanation of the CTN/ACM role, and reason for call due to risk factors for infection and/or exposure to COVID-19. Symptoms reviewed with patient who verbalized the following symptoms: fever, cough and shortness of breath      Due to no new or worsening symptoms encounter was not routed to provider for escalation. Discussed follow-up appointments. If no appointment was previously scheduled, appointment scheduling offered:  no. [address] follow up appointment(s):   Future Appointments   Date Time Provider Brittany Richards   5/3/2022 11:40 AM Matheus Shah, DO [facility]     Non-BS follow up appointment(s): patient to follow up w/ PCP     Interventions to address risk factors: Obtained and reviewed discharge summary and/or continuity of care documents, Reviewed and followed up on pending diagnostic tests and treatments-COVID-19 and Education of patient/family/caregiver/guardian to support self-management-contact information to [address]:   Does patient have an Advance Directive: patient declined education. Educated patient about risk for severe COVID-19 due to risk factors according to CDC guidelines. LPN CC reviewed discharge instructions, medical action plan and red flag symptoms with the patient who verbalized understanding. Discussed COVID vaccination status: yes. Education provided on COVID-19 vaccination as appropriate. Discussed exposure protocols and quarantine with CDC Guidelines.  Patient was given an opportunity to verbalize any questions and concerns and agrees to contact LPN CC or health care provider for questions related to their healthcare. Reviewed and educated patient on any new and changed medications related to discharge diagnosis     Was patient discharged with a pulse oximeter? no     LPN CC provided contact information. Plan for follow-up call in 5-7 days based on severity of symptoms and risk factors.

## 2021-09-27 ENCOUNTER — APPOINTMENT (OUTPATIENT)
Dept: CT IMAGING | Age: 66
End: 2021-09-27
Attending: PHYSICIAN ASSISTANT
Payer: MEDICARE

## 2021-09-27 ENCOUNTER — APPOINTMENT (OUTPATIENT)
Dept: GENERAL RADIOLOGY | Age: 66
End: 2021-09-27
Attending: EMERGENCY MEDICINE
Payer: MEDICARE

## 2021-09-27 ENCOUNTER — HOSPITAL ENCOUNTER (EMERGENCY)
Age: 66
Discharge: HOME | End: 2021-09-27
Attending: EMERGENCY MEDICINE
Payer: MEDICARE

## 2021-09-27 VITALS
TEMPERATURE: 97.5 F | DIASTOLIC BLOOD PRESSURE: 103 MMHG | HEIGHT: 62 IN | RESPIRATION RATE: 20 BRPM | BODY MASS INDEX: 29.21 KG/M2 | SYSTOLIC BLOOD PRESSURE: 173 MMHG | OXYGEN SATURATION: 96 % | WEIGHT: 158.73 LBS | HEART RATE: 83 BPM

## 2021-09-27 DIAGNOSIS — I10 HYPERTENSION, UNSPECIFIED TYPE: ICD-10-CM

## 2021-09-27 DIAGNOSIS — R07.89 CHEST WALL PAIN: Primary | ICD-10-CM

## 2021-09-27 DIAGNOSIS — S22.32XA CLOSED FRACTURE OF ONE RIB OF LEFT SIDE, INITIAL ENCOUNTER: ICD-10-CM

## 2021-09-27 PROCEDURE — 71101 X-RAY EXAM UNILAT RIBS/CHEST: CPT

## 2021-09-27 PROCEDURE — 99283 EMERGENCY DEPT VISIT LOW MDM: CPT

## 2021-09-27 PROCEDURE — 71250 CT THORAX DX C-: CPT

## 2021-09-27 RX ORDER — OXYCODONE AND ACETAMINOPHEN 5; 325 MG/1; MG/1
1 TABLET ORAL
Qty: 8 TABLET | Refills: 0 | Status: SHIPPED | OUTPATIENT
Start: 2021-09-27 | End: 2021-09-30

## 2021-09-28 ENCOUNTER — PATIENT OUTREACH (OUTPATIENT)
Dept: CASE MANAGEMENT | Age: 66
End: 2021-09-28

## 2021-09-28 NOTE — ED PROVIDER NOTES
EMERGENCY DEPARTMENT HISTORY AND PHYSICAL EXAM      Date: 9/27/2021  Patient Name: Socorro Bennett    History of Presenting Illness     Chief Complaint   Patient presents with    Fall     Patient reports she fell off the treadmill around 1000 and reports the pain has increased on the L side       History Provided By: Patient    HPI: Socorro Bennett, 78 y.o. smoking female with PMHx of hypothyroid, hypokalemia, postnasal drip causing chronic cough, presents BIB self to the ED with cc of left-sided chest wall pain in setting of injury that occurred this morning at 10 AM.  Patient attempted to answer her cell phone while walking on the treadmill, causing her to slip and fell directly on her left side. No head injury or LOC. She complains of left-sided chest wall pain that is worse with movements, deep inspiration, coughing. There is associated shortness of breath due to pain. Minimal pain relief with 2 Advil prior to arrival.  Declines further analgesics at this time. Denies hemoptysis, abdominal pain, nausea/vomiting, pain elsewhere. There are no other complaints, changes, or physical findings at this time. PCP: Mani An MD    No current facility-administered medications on file prior to encounter. Current Outpatient Medications on File Prior to Encounter   Medication Sig Dispense Refill    albuterol (PROVENTIL HFA, VENTOLIN HFA, PROAIR HFA) 90 mcg/actuation inhaler Take 2 Puffs by inhalation every four (4) hours as needed for Wheezing. 1 Inhaler 0    amLODIPine (NORVASC) 2.5 mg tablet Take  by mouth daily.  lisinopriL (PRINIVIL, ZESTRIL) 20 mg tablet Take 1 Tab by mouth daily. 30 Tab 1    cyanocobalamin (VITAMIN B-12) 1,000 mcg tablet Take 1,000 mcg by mouth daily.  potassium chloride (K-DUR, KLOR-CON) 20 mEq tablet Take  by mouth two (2) times a day.  multivitamin (ONE A DAY) tablet Take 1 Tab by mouth daily.       cholecalciferol (VITAMIN D3) 1,000 unit tablet Take  by mouth two (2) times a day.  indapamide (LOZOL) 2.5 mg tablet Take 2.5 mg by mouth daily.  levothyroxine (LEVOTHROID) 88 mcg tablet Take 88 mcg by mouth Daily (before breakfast). Past History     Past Medical History:  Past Medical History:   Diagnosis Date    Arthritis     Endocrine disease     Headache     Hypertension     Memory disorder     Thyroid disease        Past Surgical History:  No past surgical history on file. Family History:  Family History   Problem Relation Age of Onset   [facility] Cancer Mother     Dementia Mother     Migraines Mother     Kidney Disease Father        Social History:  Social History     Tobacco Use    Smoking status: Current Every Day Smoker     Packs/day: 0.00     Years: 38.00     Pack years: 0.00    Smokeless tobacco: Never Used    Tobacco comment: 2 cigarettes a day   Vaping Use    Vaping Use: Never used   Substance Use Topics    Alcohol use: Yes     Comment: weekends    Drug use: No       Allergies: Allergies   Allergen Reactions    Sulfa (Sulfonamide Antibiotics) Nausea Only         Review of Systems   Review of Systems   Constitutional: Negative for fever. Respiratory: Positive for cough. Cardiovascular: Positive for chest pain. Skin: Negative for wound. Neurological: Negative for dizziness and headaches. Hematological: Does not bruise/bleed easily. Physical Exam   Physical Exam  Vitals and nursing note reviewed. Constitutional:       General: She is in acute distress (in pain with movements). Appearance: Normal appearance. She is well-developed. She is not toxic-appearing or diaphoretic. HENT:      Head: Normocephalic and atraumatic. Eyes:      General: Lids are normal.      Extraocular Movements: Extraocular movements intact. Conjunctiva/sclera: Conjunctivae normal.   Cardiovascular:      Rate and Rhythm: Normal rate and regular rhythm.    Pulmonary:      Effort: Pulmonary effort is normal.      Breath sounds: Normal breath sounds. No wheezing, rhonchi or rales. Chest:      Chest wall: Tenderness (L lateral chest wall. no ecchymosis, crepitus, or stepoff. ) present. Abdominal:      Palpations: Abdomen is soft. Tenderness: There is no abdominal tenderness. There is no right CVA tenderness, left CVA tenderness, guarding or rebound. Musculoskeletal:         General: Normal range of motion. Cervical back: Normal range of motion and neck supple. Comments: No midline TTP.   5/5 strength and sensation b/l UE/LEs. Gait is steady and symmetrical.   Skin:     General: Skin is warm and dry. Neurological:      General: No focal deficit present. Mental Status: She is alert and oriented to person, place, and time. Psychiatric:         Mood and Affect: Mood normal.         Behavior: Behavior normal. Behavior is cooperative. Diagnostic Study Results     Labs -   No results found for this or any previous visit (from the past 12 hour(s)). Radiologic Studies -   CT CHEST WO CONT   Final Result   1. Chronic appearing left seventh rib fracture anteriorly. No pneumothorax or   effusion. XR RIBS LT W PA CXR MIN 3 V   Final Result   1. Acute nondisplaced fractures of the left seventh through ninth lateral ribs. No pneumothorax. CT Results  (Last 48 hours)               09/27/21 2129  CT CHEST WO CONT Final result    Impression:  1. Chronic appearing left seventh rib fracture anteriorly. No pneumothorax or   effusion. Narrative:  EXAM:  CT CHEST WO CONT   INDICATION:  3 rib fx on xray   COMPARISON: 8/18/2021. .   TECHNIQUE: Unenhanced multislice helical CT was performed from the thoracic   inlet to the adrenal glands without intravenous contrast administration. Contiguous 5 mm axial images were reconstructed and lung and soft tissue windows   were generated. Coronal and sagittal reformations were generated.  CT dose   reduction was achieved through use of a standardized protocol tailored for this   examination and automatic exposure control for dose modulation. Honey Grumbles FINDINGS:   CHEST:   Chest wall/thoracic inlet: Within normal limits. Thyroid: Within normal limits. Mediastinum/renae: There are no pathologically enlarged mediastinal, hilar or   axillary nodes. Heart/vessels: Heart is normal in size. There are atherosclerotic and coronary   artery calcifications. Aortic arch is normal in caliber. Lungs/Pleura: No focal consolidation. No pneumothorax or pleural effusion. .   ABDOMEN:   Multiple hepatic cysts    . MSK: Fracture of the anterolateral left seventh rib appears chronic. Honey Grumbles CXR Results  (Last 48 hours)    None            Medical Decision Making   I am the first provider for this patient. I reviewed the vital signs, available nursing notes, past medical history, past surgical history, family history and social history. Vital Signs-Reviewed the patient's vital signs. Patient Vitals for the past 24 hrs:   Temp Pulse Resp BP SpO2   09/27/21 2235 97.5 °F (36.4 °C) 83 20 (!) 173/103 96 %         Records Reviewed: Nursing Notes    Provider Notes (Medical Decision Making):   Discussed conflicting reports between plain film x-ray and CT chest.  The CT shows 1 chronic appearing fracture. Discussed possibility of fracture from today's accident. I feel patient is stable for discharge at this time. Advised on regular use of incentive spirometer to prevent pneumonia. NSAIDs and Percocet as needed for pain control. Advised against rib binders. Blood pressure noted to be elevated, patient states she is aware and undergoing treatment by her PCP. Recommended PCP follow-up following today's injury. ED return precautions given. Patient is in agreement this plan. ED Course:   Initial assessment performed. The patients presenting problems have been discussed, and they are in agreement with the care plan formulated and outlined with them.   I have encouraged them to ask questions as they arise throughout their visit. Case discussed with Destinee KC MD.     Critical Care Time: None    Disposition:  D/c    PLAN:  1. Discharge Medication List as of 9/27/2021 10:24 PM      START taking these medications    Details   oxyCODONE-acetaminophen (Percocet) 5-325 mg per tablet Take 1 Tablet by mouth every six (6) hours as needed for Pain for up to 3 days. Max Daily Amount: 4 Tablets., Normal, Disp-8 Tablet, R-0         CONTINUE these medications which have NOT CHANGED    Details   albuterol (PROVENTIL HFA, VENTOLIN HFA, PROAIR HFA) 90 mcg/actuation inhaler Take 2 Puffs by inhalation every four (4) hours as needed for Wheezing., Normal, Disp-1 Inhaler, R-0      amLODIPine (NORVASC) 2.5 mg tablet Take  by mouth daily. , Historical Med      lisinopriL (PRINIVIL, ZESTRIL) 20 mg tablet Take 1 Tab by mouth daily. , Normal, Disp-30 Tab,R-1      cyanocobalamin (VITAMIN B-12) 1,000 mcg tablet Take 1,000 mcg by mouth daily. , Historical Med      potassium chloride (K-DUR, KLOR-CON) 20 mEq tablet Take  by mouth two (2) times a day., Historical Med      multivitamin (ONE A DAY) tablet Take 1 Tab by mouth daily. , Historical Med      cholecalciferol (VITAMIN D3) 1,000 unit tablet Take  by mouth two (2) times a day., Historical Med      indapamide (LOZOL) 2.5 mg tablet Take 2.5 mg by mouth daily. , Historical Med      levothyroxine (LEVOTHROID) 88 mcg tablet Take 88 mcg by mouth Daily (before breakfast). , Historical Med           2. Follow-up Information     Follow up With Specialties Details Why Contact Info    [facility] EMERGENCY DEPT Emergency Medicine  As needed, If symptoms worsen [address] [facility] [address]    Piero Krishna MD Family Medicine Call  For follow up [address] [place] [address]  [phone]          Return to ED if worse     Diagnosis     Clinical Impression:   1.  Chest wall pain    2. Closed fracture of one rib of left side, initial encounter    3. Hypertension, unspecified type          Please note that this dictation was completed with [employer], the computer voice recognition software. Quite often unanticipated grammatical, syntax, homophones, and other interpretive errors are inadvertently transcribed by the computer software. Please disregards these errors. Please excuse any errors that have escaped final proofreading.

## 2021-09-28 NOTE — PROGRESS NOTES
Educated patient about risk for severe COVID-19 due to risk factors according to CDC guidelines. ACM reviewed discharge instructions, medical action plan and red flag symptoms with the patient who verbalized understanding. Discussed COVID vaccination status: no. Education provided on COVID-19 vaccination as appropriate. Discussed exposure protocols and quarantine with CDC Guidelines. Patient was given an opportunity to verbalize any questions and concerns and agrees to contact ACM or health care provider for questions related to their healthcare.

## 2022-01-14 ENCOUNTER — TRANSCRIBE ORDER (OUTPATIENT)
Dept: SCHEDULING | Age: 67
End: 2022-01-14

## 2022-01-14 DIAGNOSIS — J40 BRONCHITIS: ICD-10-CM

## 2022-01-14 DIAGNOSIS — R93.89 ABNORMAL CHEST X-RAY: Primary | ICD-10-CM

## 2022-01-20 ENCOUNTER — HOSPITAL ENCOUNTER (OUTPATIENT)
Dept: CT IMAGING | Age: 67
Discharge: HOME | End: 2022-01-20
Attending: FAMILY MEDICINE
Payer: MEDICARE

## 2022-01-20 DIAGNOSIS — J40 BRONCHITIS: ICD-10-CM

## 2022-01-20 DIAGNOSIS — R93.89 ABNORMAL CHEST X-RAY: ICD-10-CM

## 2022-01-20 PROCEDURE — 71250 CT THORAX DX C-: CPT

## 2022-03-18 PROBLEM — R55 SYNCOPE AND COLLAPSE: Status: ACTIVE | Noted: 2018-06-26

## 2022-03-19 PROBLEM — E87.6 HYPOKALEMIA: Status: ACTIVE | Noted: 2020-08-07

## 2022-03-19 PROBLEM — F32.9 REACTIVE DEPRESSION: Status: ACTIVE | Noted: 2018-10-04

## 2022-03-19 PROBLEM — R41.82 AMS (ALTERED MENTAL STATUS): Status: ACTIVE | Noted: 2020-08-07

## 2022-03-19 PROBLEM — E83.42 HYPOMAGNESEMIA: Status: ACTIVE | Noted: 2020-08-07

## 2022-03-19 PROBLEM — F32.A MILD DEPRESSION: Status: ACTIVE | Noted: 2019-02-07

## 2022-05-02 NOTE — PROGRESS NOTES
Neurology Note    Patient ID:  Mikhail Dorantes  [id number]  75 y.o.  1955      Date of Consultation:  May 3, 2022      Subjective: my memory is better       History of Present Illness:   Mikhail Dorantes is a 75 y.o. female who returns to the neurology clinic at [facility] for an evaluation. The patient was last seen in the clinic on July 27, 2021 please see my history of present illness, examination, and treatment plan from that day. She was seen by prior neurologists in the past before my first encounter with her. 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 Since she was last here, she does feel that her depression has improved. Continues to sleep better. She has been able to officially wean completely off of amitriptyline and venlafaxine. She does feel that her mood is doing better. She is staying quite active. She still continues to go to the gym 3 days a week and walks regularly. She also goes to many of her grandchildren's athletic events. She goes out to eat with friends and has now begun to go to [employer] again. She does feel that her mental status is doing well. She is able to pay all of her bills. She has not gotten lost with driving. She did have some recent lower GI issues that is being worked up. She is scheduled for an upper coming colonoscopy in August.    She denies any new numbness, tingling, or weakness. Past Medical History:   Diagnosis Date    Arthritis     Endocrine disease     Headache     Hypertension     Memory disorder     Thyroid disease         No past surgical history on file.    No pertinent past surgical history    Family History   Problem Relation Age of Onset    Cancer Mother     Dementia Mother     Migraines Mother     Kidney Disease Father         Social History     Tobacco Use    Smoking status: Current Every Day Smoker     Packs/day: 0.00     Years: 38.00     Pack years: 0.00    Smokeless tobacco: Never Used    Tobacco comment: 2 cigarettes a day   Substance Use Topics    Alcohol use: Yes     Comment: weekends        Allergies   Allergen Reactions    Sulfa (Sulfonamide Antibiotics) Nausea Only        Prior to Admission medications    Medication Sig Start Date End Date Taking? Authorizing Provider   albuterol (PROVENTIL HFA, VENTOLIN HFA, PROAIR HFA) 90 mcg/actuation inhaler Take 2 Puffs by inhalation every four (4) hours as needed for Wheezing. 8/18/21  Yes Peggy Diego MD   amLODIPine (NORVASC) 2.5 mg tablet Take  by mouth daily. Yes Provider, Historical   lisinopriL (PRINIVIL, ZESTRIL) 20 mg tablet Take 1 Tab by mouth daily. 8/10/20  Yes Kaila Zarate MD   cyanocobalamin (VITAMIN B-12) 1,000 mcg tablet Take 1,000 mcg by mouth daily. Yes Provider, Historical   potassium chloride (K-DUR, KLOR-CON) 20 mEq tablet Take  by mouth two (2) times a day. Yes Provider, Historical   multivitamin (ONE A DAY) tablet Take 1 Tab by mouth daily. Yes Provider, Historical   cholecalciferol (VITAMIN D3) 1,000 unit tablet Take  by mouth two (2) times a day. Yes Provider, Historical   indapamide (LOZOL) 2.5 mg tablet Take 2.5 mg by mouth daily. Yes Venancio, MD Blanco   levothyroxine (LEVOTHROID) 88 mcg tablet Take 88 mcg by mouth Daily (before breakfast).    Yes Other, MD Blanco       Review of Systems:    General, constitutional: negative  Eyes, vision: negative  Ears, nose, throat: negative  Cardiovascular, heart: negative  Respiratory: negative  Gastrointestinal: negative  Genitourinary: negative  Musculoskeletal: negative  Skin and integumentary: negative  Psychiatric: dePression and anxiety  Endocrine: negative  Neurological: negative, except for HPI  Hematologic/lymphatic: negative  Allergy/immunology: negative      Objective:     Visit Vitals  /66   Pulse 85   Temp 97.4 °F (36.3 °C) (Temporal)   Ht 5' 2\" (1.575 m)   Wt 147 lb 12.8 oz (67 kg)   SpO2 96%   BMI 27.03 kg/m²       Physical Exam:      General:  appears well nourished in no acute distress  Neck: no carotid bruits  Lungs: clear to auscultation  Heart:  no murmurs, regular rate  Lower extremity: peripheral pulses palpable and no edema  Skin: intact    Neurological exam:    Awake, alert, oriented to person, place and time  Recent and remote memory were normal.  She was able to register 5 objects. She remembered 3 out of 5 objects at 5 minutes. She could perform similarities and differences. She could perform calculations. She could state the months of the year backwards. Attention and concentration were intact  Language was intact. There was no aphasia  Speech: no dysarthria  Fund of knowledge was preserved    Cranial nerves:   II-XII were tested    Perrrla  Visual fields were full  Eomi, no evidence of nystagmus  Facial sensation:  normal and symmetric  Facial motor: normal and symmetric  Hearing intact  SCM strength intact  Tongue: midline without fasciculations    Motor: Tone normal  Pronator drift was absent  No evidence of fasciculations    Strength testing:   deltoid triceps biceps Wrist ext. Wrist flex. intrinsics Hip flex. Hip ext. Knee ext. Knee flex Dorsi flex Plantar flex   Right 5 5 5 5 5 5 5 5 5 5 5 5   Left 5 5 5 5 5 5 5 5 5 5 5 5         Sensory:  Upper extremity: intact to pp, light touch, and vibration > 10 seconds  Lower extremity: Pinprick is only minimally decreased on the dorsum of her toes. Vibration was 10 seconds. unchanged    Reflexes:    Right Left  Biceps  2 2  Triceps 2 2  Brachiorad. 2 2  Patella  2 2  Achilles 2 2    Plantar response:  flexor bilaterally      Cerebellar testing:  no tremor apparent, finger/nose and drake were intact    Romberg: absent    Gait: steady.      Labs:     Lab Results   Component Value Date/Time    Hemoglobin A1c 5.0 08/08/2020 06:12 AM    Sodium 131 (L) 08/18/2021 10:25 AM    Potassium 3.8 08/18/2021 10:25 AM    Chloride 99 08/18/2021 10:25 AM    Glucose 117 (H) 08/18/2021 10:25 AM    BUN 13 08/18/2021 10:25 AM    Creatinine 0.72 08/18/2021 10:25 AM    Calcium 9.9 08/18/2021 10:25 AM    WBC 12.9 (H) 08/18/2021 09:46 AM    HCT 39.2 08/18/2021 09:46 AM    HGB 13.7 08/18/2021 09:46 AM    PLATELET 364 71/39/5485 09:46 AM       Imaging:    Results from Hospital Encounter encounter on 08/07/20    MRI BRAIN WO CONT    Narrative  EXAM: MRI BRAIN WO CONT    INDICATION: AMS    COMPARISON: CT from earlier the same day. CONTRAST: None. TECHNIQUE:  Multiplanar multisequence acquisition without contrast of the brain. FINDINGS:  Study is extremely limited by patient motion. On limited imaging was performed. The diffusion images are relatively interpretable demonstrate no evidence of  diffusion obstruction. T2-weighted images are suboptimal but grossly normal.  Other findings include left maxillary sinus opacification. Impression  IMPRESSION:  Suboptimal study secondary to extensive motion. Diffusion-weighted imaging is  grossly normal. Remainder of the sequences are limited. Results from [place] encounter on 01/20/22    CT CHEST WO CONT    Narrative  INDICATION: Follow-up abnormal CT scan, abnormal finding on diagnostic imaging    COMPARISON: 9/27/2021    CONTRAST: None. TECHNIQUE: 2.5 and 5 mm axial images were obtained through the chest. Coronal  and sagittal reconstructions were generated. The absence of intravenous contrast  reduces the sensitivity for evaluation of the mediastinum and upper abdominal  organs. CT dose reduction was achieved through use of a standardized protocol  tailored for this examination and automatic exposure control for dose  modulation. FINDINGS:    THYROID: The right lobe of thyroid gland has been removed. Left lobe images  normally. .  MEDIASTINUM: No mass or lymphadenopathy. VONDA: No mass or lymphadenopathy. THORACIC AORTA: No aneurysm. MAIN PULMONARY ARTERY: Normal in caliber.   TRACHEA/BRONCHI: Patent. ESOPHAGUS: No wall thickening or dilatation. HEART: Normal in size. There is moderate to severe coronary artery  calcification. PLEURA: No effusion or pneumothorax. LUNGS: There is a small clustered calcified nodules posteriorly in the left  lower lobe consistent with granulomas. These are stable. There is a 2 mm nodule laterally in the right upper lobe that is stable. On image 53 anteriorly in the superior segment of the left lower lobe is a small  low-density nodule measuring 4 mm. This was not definitely present on the  previous exam. Since this is new follow-up exam is suggested. .  INCIDENTALLY IMAGED UPPER ABDOMEN: The hepatic cysts are again noted. BONES: No destructive bone lesion. There is healing or healed fracture of the  anterior aspect the left fourth and fifth ribs. There is callus formation  consistent with healed or healing fractures of the lateral seventh eighth and  ninth ribs. Impression  1. New 4 mm nodule anteriorly in the left lower lobe on axial image 53. Follow-up exam in 6 months is suggested. Assessment and Plan:   The patient is a pleasant 76-year-old female who has been followed in the neurology clinic previously for a pseudodementia associated with depression who has a new superimposed situational depression after the passing away of her . This has continued to improve. Situational depression superimposed on her underlying depression: This had led to worsening of her cognition and a prior diagnosis of a pseudodementia. She has been able to wean off of the amitriptyline and venlafaxine. Her mood and spirit is significantly better than previously. I commended her on her activity and level of social engagement which has been a positive. She did well on my cognitive assessments today. I did stress to her to continue with her active physical lifestyle and continue with cognitive stimulating activities.           Patient Active Problem List Diagnosis Code    Syncope and collapse R55    Reactive depression F32.9    Mild depression F32. A    Hypokalemia E87.6    Hypomagnesemia E83.42    AMS (altered mental status) R41.82               The patient should return to clinic in 12 months. Renewed medication: none    I spent   25  minutes on the day of the encounter preparing the office visit by reviewing medical records, obtaining a history, performing examination, counseling and educating the patient on diagnosis,  documenting in the clinical medical record, and coordinating the care for the patient. The patient had the ability to ask questions and all questions were answered.           Signed By:  Donna Wright DO FAAN    May 3, 2022

## 2022-05-03 ENCOUNTER — OFFICE VISIT (OUTPATIENT)
Dept: NEUROLOGY | Age: 67
End: 2022-05-03
Payer: MEDICARE

## 2022-05-03 VITALS
WEIGHT: 147.8 LBS | HEIGHT: 62 IN | HEART RATE: 85 BPM | SYSTOLIC BLOOD PRESSURE: 108 MMHG | OXYGEN SATURATION: 96 % | DIASTOLIC BLOOD PRESSURE: 66 MMHG | TEMPERATURE: 97.4 F | BODY MASS INDEX: 27.2 KG/M2

## 2022-05-03 DIAGNOSIS — F32.9 REACTIVE DEPRESSION: ICD-10-CM

## 2022-05-03 DIAGNOSIS — F32.4 MAJOR DEPRESSIVE DISORDER WITH SINGLE EPISODE, IN PARTIAL REMISSION (HCC): Primary | ICD-10-CM

## 2022-05-03 DIAGNOSIS — G31.84 MCI (MILD COGNITIVE IMPAIRMENT): ICD-10-CM

## 2022-05-03 PROCEDURE — 99213 OFFICE O/P EST LOW 20 MIN: CPT | Performed by: PSYCHIATRY & NEUROLOGY

## 2022-05-03 PROCEDURE — 3017F COLORECTAL CA SCREEN DOC REV: CPT | Performed by: PSYCHIATRY & NEUROLOGY

## 2022-05-03 PROCEDURE — G8419 CALC BMI OUT NRM PARAM NOF/U: HCPCS | Performed by: PSYCHIATRY & NEUROLOGY

## 2022-05-03 PROCEDURE — 1101F PT FALLS ASSESS-DOCD LE1/YR: CPT | Performed by: PSYCHIATRY & NEUROLOGY

## 2022-05-03 PROCEDURE — G8400 PT W/DXA NO RESULTS DOC: HCPCS | Performed by: PSYCHIATRY & NEUROLOGY

## 2022-05-03 PROCEDURE — G8536 NO DOC ELDER MAL SCRN: HCPCS | Performed by: PSYCHIATRY & NEUROLOGY

## 2022-05-03 PROCEDURE — 1090F PRES/ABSN URINE INCON ASSESS: CPT | Performed by: PSYCHIATRY & NEUROLOGY

## 2022-05-03 PROCEDURE — G8427 DOCREV CUR MEDS BY ELIG CLIN: HCPCS | Performed by: PSYCHIATRY & NEUROLOGY

## 2022-05-03 PROCEDURE — G9717 DOC PT DX DEP/BP F/U NT REQ: HCPCS | Performed by: PSYCHIATRY & NEUROLOGY

## 2022-06-23 ENCOUNTER — TRANSCRIBE ORDER (OUTPATIENT)
Dept: SCHEDULING | Age: 67
End: 2022-06-23

## 2022-06-23 DIAGNOSIS — R10.30 LOWER ABDOMINAL PAIN: ICD-10-CM

## 2022-06-23 DIAGNOSIS — R19.4 CHANGE IN BOWEL HABIT: Primary | ICD-10-CM

## 2022-06-23 DIAGNOSIS — Z86.010 HX OF COLONIC POLYPS: ICD-10-CM

## 2022-07-05 ENCOUNTER — HOSPITAL ENCOUNTER (OUTPATIENT)
Dept: ULTRASOUND IMAGING | Age: 67
Discharge: HOME | End: 2022-07-05
Attending: NURSE PRACTITIONER
Payer: MEDICARE

## 2022-07-05 DIAGNOSIS — R10.30 LOWER ABDOMINAL PAIN: ICD-10-CM

## 2022-07-05 DIAGNOSIS — R19.4 CHANGE IN BOWEL HABIT: ICD-10-CM

## 2022-07-05 DIAGNOSIS — Z86.010 HX OF COLONIC POLYPS: ICD-10-CM

## 2022-07-05 PROCEDURE — 76700 US EXAM ABDOM COMPLETE: CPT

## 2022-07-08 ENCOUNTER — TRANSCRIBE ORDER (OUTPATIENT)
Dept: SCHEDULING | Age: 67
End: 2022-07-08

## 2022-07-08 DIAGNOSIS — R31.29 MICROSCOPIC HEMATURIA: Primary | ICD-10-CM

## 2022-07-08 DIAGNOSIS — R93.3 ABNORMAL FINDING ON GI TRACT IMAGING: Primary | ICD-10-CM

## 2022-07-18 ENCOUNTER — HOSPITAL ENCOUNTER (OUTPATIENT)
Dept: CT IMAGING | Age: 67
Discharge: HOME | End: 2022-07-18
Attending: STUDENT IN AN ORGANIZED HEALTH CARE EDUCATION/TRAINING PROGRAM
Payer: MEDICARE

## 2022-07-18 DIAGNOSIS — R31.29 MICROSCOPIC HEMATURIA: ICD-10-CM

## 2022-07-18 LAB — CREAT BLD-MCNC: 0.6 MG/DL (ref 0.6–1.3)

## 2022-07-18 PROCEDURE — 82565 ASSAY OF CREATININE: CPT

## 2022-07-18 PROCEDURE — 74178 CT ABD&PLV WO CNTR FLWD CNTR: CPT

## 2022-07-18 PROCEDURE — 74011000636 HC RX REV CODE- 636: Performed by: STUDENT IN AN ORGANIZED HEALTH CARE EDUCATION/TRAINING PROGRAM

## 2022-07-18 RX ADMIN — IOPAMIDOL 100 ML: 755 INJECTION, SOLUTION INTRAVENOUS at 15:07

## 2022-08-11 ENCOUNTER — ANESTHESIA EVENT (OUTPATIENT)
Dept: ENDOSCOPY | Age: 67
End: 2022-08-11
Payer: MEDICARE

## 2022-08-11 RX ORDER — ROSUVASTATIN CALCIUM 5 MG/1
5 TABLET, COATED ORAL
COMMUNITY

## 2022-08-11 NOTE — ANESTHESIA PREPROCEDURE EVALUATION
Relevant Problems   NEUROLOGY   (+) Mild depression   (+) Reactive depression       Anesthetic History   No history of anesthetic complications            Review of Systems / Medical History  Patient summary reviewed, nursing notes reviewed and pertinent labs reviewed    Pulmonary                Comments: Former smoker (Quit 7/2021)   Neuro/Psych         Psychiatric history (Depression)     Cardiovascular    Hypertension          Hyperlipidemia    Exercise tolerance: >4 METS  Comments: ECG (8/18/21):  Normal sinus rhythm   Possible Left atrial enlargement   Left axis deviation   Incomplete right bundle branch block   When compared with ECG of 07-AUG-2020 14:13,   No significant change was found     Nuclear Stress Test (7/31/18): Myocardial perfusion imaging is normal. Overall left ventricular systolic function was normal.   These test results indicate low likelihood for the presence of angiographically significant coronary artery disease. Abnormal EKG response to exercise.         TTE (4/25/18):   Concentric LVH, EF=60-65%     GI/Hepatic/Renal               Comments: Change in bowel habits   Abdominal pain, lower   Hx of colonic polyps  Endo/Other      Hypothyroidism  Arthritis    Comments: Hx Thyroidectomy (1974, 1989) Other Findings            Physical Exam    Airway  Mallampati: II  TM Distance: 4 - 6 cm  Neck ROM: normal range of motion   Mouth opening: Normal     Cardiovascular  Regular rate and rhythm,  S1 and S2 normal,  no murmur, click, rub, or gallop             Dental    Dentition: Caps/crowns  Comments: None in the front   Pulmonary  Breath sounds clear to auscultation               Abdominal  GI exam deferred       Other Findings            Anesthetic Plan    ASA: 2  Anesthesia type: total IV anesthesia          Induction: Intravenous  Anesthetic plan and risks discussed with: Patient

## 2022-08-12 ENCOUNTER — HOSPITAL ENCOUNTER (OUTPATIENT)
Age: 67
Setting detail: OUTPATIENT SURGERY
Discharge: HOME | End: 2022-08-12
Attending: INTERNAL MEDICINE | Admitting: INTERNAL MEDICINE
Payer: MEDICARE

## 2022-08-12 ENCOUNTER — ANESTHESIA (OUTPATIENT)
Dept: ENDOSCOPY | Age: 67
End: 2022-08-12
Payer: MEDICARE

## 2022-08-12 VITALS
SYSTOLIC BLOOD PRESSURE: 138 MMHG | DIASTOLIC BLOOD PRESSURE: 68 MMHG | RESPIRATION RATE: 21 BRPM | OXYGEN SATURATION: 97 % | TEMPERATURE: 98.1 F | BODY MASS INDEX: 27.23 KG/M2 | HEIGHT: 62 IN | WEIGHT: 148 LBS | HEART RATE: 67 BPM

## 2022-08-12 PROCEDURE — 74011250636 HC RX REV CODE- 250/636: Performed by: ANESTHESIOLOGY

## 2022-08-12 PROCEDURE — 74011250637 HC RX REV CODE- 250/637: Performed by: INTERNAL MEDICINE

## 2022-08-12 PROCEDURE — 76060000031 HC ANESTHESIA FIRST 0.5 HR: Performed by: INTERNAL MEDICINE

## 2022-08-12 PROCEDURE — 76040000019: Performed by: INTERNAL MEDICINE

## 2022-08-12 PROCEDURE — 74011000250 HC RX REV CODE- 250: Performed by: ANESTHESIOLOGY

## 2022-08-12 PROCEDURE — 2709999900 HC NON-CHARGEABLE SUPPLY: Performed by: INTERNAL MEDICINE

## 2022-08-12 PROCEDURE — 74011250636 HC RX REV CODE- 250/636: Performed by: INTERNAL MEDICINE

## 2022-08-12 PROCEDURE — 88305 TISSUE EXAM BY PATHOLOGIST: CPT

## 2022-08-12 PROCEDURE — 77030021593 HC FCPS BIOP ENDOSC BSC -A: Performed by: INTERNAL MEDICINE

## 2022-08-12 PROCEDURE — 77030013992 HC SNR POLYP ENDOSC BSC -B: Performed by: INTERNAL MEDICINE

## 2022-08-12 RX ORDER — ATROPINE SULFATE 0.1 MG/ML
0.5 INJECTION INTRAVENOUS
Status: DISCONTINUED | OUTPATIENT
Start: 2022-08-12 | End: 2022-08-12 | Stop reason: HOSPADM

## 2022-08-12 RX ORDER — EPINEPHRINE 0.1 MG/ML
1 INJECTION INTRACARDIAC; INTRAVENOUS
Status: DISCONTINUED | OUTPATIENT
Start: 2022-08-12 | End: 2022-08-12 | Stop reason: HOSPADM

## 2022-08-12 RX ORDER — SODIUM CHLORIDE 0.9 % (FLUSH) 0.9 %
5-40 SYRINGE (ML) INJECTION EVERY 8 HOURS
Status: DISCONTINUED | OUTPATIENT
Start: 2022-08-12 | End: 2022-08-12 | Stop reason: HOSPADM

## 2022-08-12 RX ORDER — SODIUM CHLORIDE 0.9 % (FLUSH) 0.9 %
5-40 SYRINGE (ML) INJECTION AS NEEDED
Status: DISCONTINUED | OUTPATIENT
Start: 2022-08-12 | End: 2022-08-12 | Stop reason: HOSPADM

## 2022-08-12 RX ORDER — SODIUM CHLORIDE 9 MG/ML
100 INJECTION, SOLUTION INTRAVENOUS CONTINUOUS
Status: DISCONTINUED | OUTPATIENT
Start: 2022-08-12 | End: 2022-08-12 | Stop reason: HOSPADM

## 2022-08-12 RX ORDER — LIDOCAINE HYDROCHLORIDE 20 MG/ML
INJECTION, SOLUTION EPIDURAL; INFILTRATION; INTRACAUDAL; PERINEURAL AS NEEDED
Status: DISCONTINUED | OUTPATIENT
Start: 2022-08-12 | End: 2022-08-12 | Stop reason: HOSPADM

## 2022-08-12 RX ORDER — MIDAZOLAM HYDROCHLORIDE 1 MG/ML
.25-5 INJECTION, SOLUTION INTRAMUSCULAR; INTRAVENOUS
Status: DISCONTINUED | OUTPATIENT
Start: 2022-08-12 | End: 2022-08-12 | Stop reason: HOSPADM

## 2022-08-12 RX ORDER — PROPOFOL 10 MG/ML
INJECTION, EMULSION INTRAVENOUS AS NEEDED
Status: DISCONTINUED | OUTPATIENT
Start: 2022-08-12 | End: 2022-08-12 | Stop reason: HOSPADM

## 2022-08-12 RX ORDER — DEXTROMETHORPHAN/PSEUDOEPHED 2.5-7.5/.8
1.2 DROPS ORAL
Status: DISCONTINUED | OUTPATIENT
Start: 2022-08-12 | End: 2022-08-12 | Stop reason: HOSPADM

## 2022-08-12 RX ORDER — NALOXONE HYDROCHLORIDE 0.4 MG/ML
0.4 INJECTION, SOLUTION INTRAMUSCULAR; INTRAVENOUS; SUBCUTANEOUS
Status: DISCONTINUED | OUTPATIENT
Start: 2022-08-12 | End: 2022-08-12 | Stop reason: HOSPADM

## 2022-08-12 RX ORDER — FLUMAZENIL 0.1 MG/ML
0.2 INJECTION INTRAVENOUS
Status: DISCONTINUED | OUTPATIENT
Start: 2022-08-12 | End: 2022-08-12 | Stop reason: HOSPADM

## 2022-08-12 RX ADMIN — SIMETHICONE 80 MG: 20 SUSPENSION/ DROPS ORAL at 07:58

## 2022-08-12 RX ADMIN — PROPOFOL 200 MG: 10 INJECTION, EMULSION INTRAVENOUS at 08:12

## 2022-08-12 RX ADMIN — SODIUM CHLORIDE 100 ML/HR: 9 INJECTION, SOLUTION INTRAVENOUS at 07:28

## 2022-08-12 RX ADMIN — LIDOCAINE HYDROCHLORIDE 40 MG: 20 INJECTION, SOLUTION EPIDURAL; INFILTRATION; INTRACAUDAL; PERINEURAL at 07:54

## 2022-08-12 NOTE — PROGRESS NOTES
Procedure results, discharge information and sedation/analgesia instructions reviewed with and given to patient only per patient request pre-procedure.

## 2022-08-12 NOTE — DISCHARGE INSTRUCTIONS
Roselia Pichardo MD  [facility], [address]  [address]  [phone]  [email or web address]  [id number]  1955    COLON DISCHARGE INSTRUCTIONS  Discomfort:  Redness at IV site- apply warm compress to area; if redness or soreness persist- contact your physician  There may be a slight amount of blood passed from the rectum  Gaseous discomfort- walking, belching will help relieve any discomfort  You may not operate a vehicle for 12 hours  You may not engage in an occupation involving machinery or appliances for rest of today  You may not drink alcoholic beverages for at least 12 hours  Avoid making any critical decisions for at least 24 hour  DIET:   High fiber diet. - however -  remember your colon is empty and a heavy meal will produce gas. Avoid these foods:  vegetables, fried / greasy foods, carbonated drinks for today      ACTIVITY:  You may resume your normal daily activities it is recommended that you spend the remainder of the day resting -  avoid any strenuous activity. CALL M.D. ANY SIGN OF:   Increasing pain, nausea, vomiting  Abdominal distension (swelling)  New increased bleeding (oral or rectal)  Fever (chills)  Pain in chest area  Bloody discharge from nose or mouth  Shortness of breath     COLONOSCOPY FINDINGS:  Your colonoscopy showed: one very small polyp which was removed and hemorrhoids. Took some biopsies to check for microscopic colitis. Follow-up Instructions:   Call Dr. Roselia Pichardo if any questions or problems. Telephone # [phone]  Dr. Adriana Moore office will notify you of the biopsy results within 7 to 10 days. Should have a repeat colonoscopy in 5 years.

## 2022-08-12 NOTE — PROCEDURES
[facility]                  Colonoscopy Operative Report    8/12/2022      Omero Orellana  [id number]  1955    Procedure Type:   Colonoscopy --diagnostic     Indications:    Diarrhea, Personal history of colon polyps (screening only)     Pre-operative Diagnosis: see indication above    Post-operative Diagnosis:  See findings below    :  Gary Lugo MD    Referring Provider: Pato Morris MD      Sedation:  MAC anesthesia Propofol    Pre-Procedural Exam:      Airway: clear,  No airway problems anticipated  Heart: RRR, without gallops or rubs  Lungs: clear bilaterally without wheezes, crackles, or rhonchi  Abdomen: soft, nontender, nondistended, bowel sounds present  Mental Status: awake, alert and oriented to person, place and time     Procedure Details:  After informed consent was obtained with all risks and benefits of procedure explained and preoperative exam completed, the patient was taken to the endoscopy suite and placed in the left lateral decubitus position. Upon sequential sedation as per above, a digital rectal exam was performed . The Olympus videocolonoscope  was inserted in the rectum and carefully advanced to the cecum, which was identified by the ileocecal valve and appendiceal orifice. The cecum was identified by the ileocecal valve and appendiceal orifice. The quality of preparation was excellent. The colonoscope was slowly withdrawn with careful evaluation between folds. Retroflexion in the rectum was completed demonstrating internal hemorrhoids. Findings:   Rectum: Grade 1 internal hemorrhoid(s); Sigmoid: 4 mm polyp, cold snared, but not retrieved  Descending Colon: normal, biopsied  Transverse Colon: normal  Ascending Colon: normal, biopsied  Cecum: normal  Terminal Ileum: normal      Specimen Removed:   1. Biopsies of ascending colon  2 biopsies of the descending colon    Complications: None. EBL:  None.     Impression: normal colonic mucosa throughout  hemorrhoids internal, Small in size  Small sigmoid polyp removed    Recommendations: --Await pathology. , -Repeat colonoscopy in 5 years. High fiber diet. Resume normal medication(s). Discharge Disposition:  Home in the company of a  when able to ambulate. Vanessa Huff MD    8/12/2022     RAMSES Pendleton MD  [facility], [address] [facility] [address] [place] [address]  [phone]  [email or web address]

## 2022-08-12 NOTE — H&P
Qing Hobson MD  [facility], [address], [address]  [address]  [phone]  [email or web address]      Gastroenterology Outpatient History and Physical    Patient: Kenrick Fu    Physician: Radha Leblanc MD    Vital Signs: Blood pressure (!) 142/74, pulse 67, temperature 98.1 °F (36.7 °C), resp. rate 22, height 5' 2\" (1.575 m), weight 67.1 kg (148 lb), SpO2 97 %, not currently breastfeeding. Allergies: Allergies   Allergen Reactions    Sulfa (Sulfonamide Antibiotics) Nausea Only       Chief Complaint: change in bowel habits    History of Present Illness: History of colonic polyps and change in bowel habits    History:  Past Medical History:   Diagnosis Date    Arthritis     Endocrine disease     Headache     Hypertension     Memory disorder     Thyroid disease       Past Surgical History:   Procedure Laterality Date    HX  SECTION  26    HX ORTHOPAEDIC      surgery for arthritis in feet    HX THYROIDECTOMY      1989      Social History     Socioeconomic History    Marital status:    Tobacco Use    Smoking status: Former     Packs/day: 0.00     Years: 38.00     Pack years: 0.00     Types: Cigarettes     Quit date: 2021     Years since quittin.1    Smokeless tobacco: Never    Tobacco comments:     2 cigarettes a day   Vaping Use    Vaping Use: Never used   Substance and Sexual Activity    Alcohol use: Yes     Comment: weekends    Drug use: No      Family History   Problem Relation Age of Onset    Cancer Mother     Dementia Mother     Migraines Mother     Kidney Disease Father       Patient Active Problem List   Diagnosis Code    Syncope and collapse R55    Reactive depression F32.9    Mild depression F32. A    Hypokalemia E87.6    Hypomagnesemia E83.42    AMS (altered mental status) R41.82         Medications:   Prior to Admission medications    Medication Sig Start Date End Date Taking? Authorizing Provider   rosuvastatin (CRESTOR) 5 mg tablet Take 5 mg by mouth nightly. Yes Provider, Historical   amLODIPine (NORVASC) 2.5 mg tablet Take  by mouth daily. Yes Provider, Historical   lisinopriL (PRINIVIL, ZESTRIL) 20 mg tablet Take 1 Tab by mouth daily. 8/10/20  Yes Josue Bowie MD   potassium chloride (K-DUR, KLOR-CON) 20 mEq tablet Take  by mouth two (2) times a day. Yes Provider, Historical   multivitamin (ONE A DAY) tablet Take 1 Tab by mouth daily. Yes Provider, Historical   cholecalciferol (VITAMIN D3) (1000 Units /25 mcg) tablet Take  by mouth two (2) times a day. Yes Provider, Historical   indapamide (LOZOL) 2.5 mg tablet Take 1.25 mg by mouth in the morning. Yes Other, MD Blanco   levothyroxine (SYNTHROID) 88 mcg tablet Take 88 mcg by mouth Daily (before breakfast). Yes Other, MD Blanco   albuterol (PROVENTIL HFA, VENTOLIN HFA, PROAIR HFA) 90 mcg/actuation inhaler Take 2 Puffs by inhalation every four (4) hours as needed for Wheezing.  8/18/21   Maria D Lynch MD       Physical Exam:     General: well developed, well nourished   HEENT: unremarkable   Heart: regular rhythm no mumur    Lungs: clear   Abdominal:  benign   Neurological: unremarkable   Extremities: no edema     Findings/Diagnosis: Change in bowel habits, hx of polyps    Plan of Care/Planned Procedure: Colonoscopy with  monitored anesthesia care sedation       Signed:  Jus Coleman MD 8/12/2022

## 2022-08-12 NOTE — PROGRESS NOTES
Denilson Rodriguez  1955  [id number]    Situation:  Verbal report received from: Zohreh Garrett RN  Procedure: Procedure(s):  COLONOSCOPY  COLON BIOPSY  ENDOSCOPIC POLYPECTOMY    Background:    Preoperative diagnosis: Change in bowel habits [R19.4]  Abdominal pain, lower [R10.30]  Hx of colonic polyps [Z86.010]  Postoperative diagnosis: polyp, hemorrhoids, and history of diarrhea    :  Dr. Lazarus Points  Assistant(s): Endoscopy Technician-1: Chandra De Leon  Endoscopy RN-1: Gaurang Long RN; Vanessa Schumacher RN    Specimens:   ID Type Source Tests Collected by Time Destination   1 : Ascending Colon BX Preservative Colon, Ascending  Fortunato Kwan MD 8/12/2022 6965 Pathology   2 : Descending Colon BX Preservative Colon, Descending  Fortunato Kwan MD 8/12/2022 0795 Pathology     H. Pylori  no    Assessment:  Intra-procedure medications     Anesthesia gave intra-procedure sedation and medications, see anesthesia flow sheet yes    Intravenous fluids: NS@ KVO     Vital signs stable yes    Abdominal assessment: round and soft yes    Recommendation:  Discharge patient per MD order yes.   Return to floor n/a  Family or Friend Mihir So, neighbor  Permission to share finding with family or friend no

## 2022-08-12 NOTE — PROGRESS NOTES
Endoscopy Case End Note:    0813:  Procedure scope was pre-cleaned, per protocol, at bedside by Shannan CASE      1073:  Report received from anesthesia - Dr Omar Gaytan. See anesthesia flowsheet for intra-procedure vital signs and events. 4879:  Glasses returned to patient. Sigmoid colon polyp removed but not retrieved. Dr. Karan Gan notified.

## 2022-08-12 NOTE — ANESTHESIA POSTPROCEDURE EVALUATION
Procedure(s):  COLONOSCOPY  COLON BIOPSY  ENDOSCOPIC POLYPECTOMY. total IV anesthesia    Anesthesia Post Evaluation        Patient location during evaluation: PACU  Note status: Adequate. Level of consciousness: responsive to verbal stimuli and sleepy but conscious  Pain management: satisfactory to patient  Airway patency: patent  Anesthetic complications: no  Cardiovascular status: acceptable  Respiratory status: acceptable  Hydration status: acceptable  Comments: +Post-Anesthesia Evaluation and Assessment    Patient: Rick Praksah MRN: [id number]  SSN: [id number]   YOB: 1955  Age: 75 y.o. Sex: female      Cardiovascular Function/Vital Signs    /62   Pulse (!) 58   Temp 36.7 °C (98.1 °F)   Resp 16   Ht 5' 2\" (1.575 m)   Wt 67.1 kg (148 lb)   SpO2 97%   Breastfeeding No   BMI 27.07 kg/m²     Patient is status post Procedure(s):  COLONOSCOPY  COLON BIOPSY  ENDOSCOPIC POLYPECTOMY. Nausea/Vomiting: Controlled. Postoperative hydration reviewed and adequate. Pain:  Pain Scale 1: Visual (08/12/22 0816)  Pain Intensity 1: 0 (08/12/22 0816)   Managed. Neurological Status: At baseline. Mental Status and Level of Consciousness: Arousable. Pulmonary Status:   O2 Device: None (Room air) (08/12/22 0816)   Adequate oxygenation and airway patent. Complications related to anesthesia: None    Post-anesthesia assessment completed. No concerns. Signed By: Ginny Garcia MD    8/12/2022  Post anesthesia nausea and vomiting:  controlled      INITIAL Post-op Vital signs:   Vitals Value Taken Time   /67 08/12/22 0824   Temp     Pulse 67 08/12/22 0826   Resp 19 08/12/22 0826   SpO2 98 % 08/12/22 0826   Vitals shown include unvalidated device data.

## 2022-09-27 ENCOUNTER — HOSPITAL ENCOUNTER (EMERGENCY)
Age: 67
Discharge: HOME | End: 2022-09-27
Attending: STUDENT IN AN ORGANIZED HEALTH CARE EDUCATION/TRAINING PROGRAM
Payer: MEDICARE

## 2022-09-27 ENCOUNTER — APPOINTMENT (OUTPATIENT)
Dept: GENERAL RADIOLOGY | Age: 67
End: 2022-09-27
Attending: STUDENT IN AN ORGANIZED HEALTH CARE EDUCATION/TRAINING PROGRAM
Payer: MEDICARE

## 2022-09-27 VITALS
OXYGEN SATURATION: 98 % | HEIGHT: 62 IN | TEMPERATURE: 98 F | HEART RATE: 59 BPM | WEIGHT: 149.91 LBS | SYSTOLIC BLOOD PRESSURE: 167 MMHG | BODY MASS INDEX: 27.59 KG/M2 | DIASTOLIC BLOOD PRESSURE: 90 MMHG | RESPIRATION RATE: 23 BRPM

## 2022-09-27 DIAGNOSIS — R07.89 ATYPICAL CHEST PAIN: Primary | ICD-10-CM

## 2022-09-27 LAB
ALBUMIN SERPL-MCNC: 3.6 G/DL (ref 3.5–5)
ALBUMIN/GLOB SERPL: 1.4 {RATIO} (ref 1.1–2.2)
ALP SERPL-CCNC: 107 U/L (ref 45–117)
ALT SERPL-CCNC: 39 U/L (ref 12–78)
ANION GAP SERPL CALC-SCNC: 6 MMOL/L (ref 5–15)
AST SERPL-CCNC: 16 U/L (ref 15–37)
BASOPHILS # BLD: 0.1 K/UL (ref 0–0.1)
BASOPHILS NFR BLD: 1 % (ref 0–1)
BILIRUB SERPL-MCNC: 1 MG/DL (ref 0.2–1)
BNP SERPL-MCNC: 138 PG/ML
BUN SERPL-MCNC: 14 MG/DL (ref 6–20)
BUN/CREAT SERPL: 18 (ref 12–20)
CALCIUM SERPL-MCNC: 10.2 MG/DL (ref 8.5–10.1)
CHLORIDE SERPL-SCNC: 98 MMOL/L (ref 97–108)
CO2 SERPL-SCNC: 29 MMOL/L (ref 21–32)
CREAT SERPL-MCNC: 0.76 MG/DL (ref 0.55–1.02)
DIFFERENTIAL METHOD BLD: ABNORMAL
EOSINOPHIL # BLD: 0.1 K/UL (ref 0–0.4)
EOSINOPHIL NFR BLD: 1 % (ref 0–7)
ERYTHROCYTE [DISTWIDTH] IN BLOOD BY AUTOMATED COUNT: 13.5 % (ref 11.5–14.5)
GLOBULIN SER CALC-MCNC: 2.6 G/DL (ref 2–4)
GLUCOSE SERPL-MCNC: 111 MG/DL (ref 65–100)
HCT VFR BLD AUTO: 43.5 % (ref 35–47)
HGB BLD-MCNC: 14.9 G/DL (ref 11.5–16)
IMM GRANULOCYTES # BLD AUTO: 0.1 K/UL (ref 0–0.04)
IMM GRANULOCYTES NFR BLD AUTO: 1 % (ref 0–0.5)
LYMPHOCYTES # BLD: 2.4 K/UL (ref 0.8–3.5)
LYMPHOCYTES NFR BLD: 23 % (ref 12–49)
MCH RBC QN AUTO: 29.5 PG (ref 26–34)
MCHC RBC AUTO-ENTMCNC: 34.3 G/DL (ref 30–36.5)
MCV RBC AUTO: 86.1 FL (ref 80–99)
MONOCYTES # BLD: 0.8 K/UL (ref 0–1)
MONOCYTES NFR BLD: 7 % (ref 5–13)
NEUTS SEG # BLD: 7 K/UL (ref 1.8–8)
NEUTS SEG NFR BLD: 67 % (ref 32–75)
NRBC # BLD: 0 K/UL (ref 0–0.01)
NRBC BLD-RTO: 0 PER 100 WBC
PLATELET # BLD AUTO: 308 K/UL (ref 150–400)
PMV BLD AUTO: 9 FL (ref 8.9–12.9)
POTASSIUM SERPL-SCNC: 3.7 MMOL/L (ref 3.5–5.1)
PROT SERPL-MCNC: 6.2 G/DL (ref 6.4–8.2)
RBC # BLD AUTO: 5.05 M/UL (ref 3.8–5.2)
SODIUM SERPL-SCNC: 133 MMOL/L (ref 136–145)
TROPONIN-HIGH SENSITIVITY: 9 NG/L (ref 0–51)
TROPONIN-HIGH SENSITIVITY: 9 NG/L (ref 0–51)
WBC # BLD AUTO: 10.4 K/UL (ref 3.6–11)

## 2022-09-27 PROCEDURE — 83880 ASSAY OF NATRIURETIC PEPTIDE: CPT

## 2022-09-27 PROCEDURE — 80053 COMPREHEN METABOLIC PANEL: CPT

## 2022-09-27 PROCEDURE — 96374 THER/PROPH/DIAG INJ IV PUSH: CPT

## 2022-09-27 PROCEDURE — 74011250636 HC RX REV CODE- 250/636: Performed by: STUDENT IN AN ORGANIZED HEALTH CARE EDUCATION/TRAINING PROGRAM

## 2022-09-27 PROCEDURE — 85025 COMPLETE CBC W/AUTO DIFF WBC: CPT

## 2022-09-27 PROCEDURE — 36415 COLL VENOUS BLD VENIPUNCTURE: CPT

## 2022-09-27 PROCEDURE — 71046 X-RAY EXAM CHEST 2 VIEWS: CPT

## 2022-09-27 PROCEDURE — 99285 EMERGENCY DEPT VISIT HI MDM: CPT

## 2022-09-27 PROCEDURE — 93005 ELECTROCARDIOGRAM TRACING: CPT

## 2022-09-27 PROCEDURE — 84484 ASSAY OF TROPONIN QUANT: CPT

## 2022-09-27 RX ORDER — KETOROLAC TROMETHAMINE 30 MG/ML
15 INJECTION, SOLUTION INTRAMUSCULAR; INTRAVENOUS
Status: COMPLETED | OUTPATIENT
Start: 2022-09-27 | End: 2022-09-27

## 2022-09-27 RX ADMIN — KETOROLAC TROMETHAMINE 15 MG: 30 INJECTION, SOLUTION INTRAMUSCULAR at 15:22

## 2022-09-27 NOTE — DISCHARGE INSTRUCTIONS
Please call cardiology for follow-up given chest pain today. Please continue to monitor symptoms at home and if you have worsening symptoms, shortness of breath, nausea, vomiting or any changes please return to ER immediately.

## 2022-09-27 NOTE — ED NOTES
Pt reports chest pain after her workout, reports it was nothing strenuous. Chest pain behind her left breast, achy pain, sharp when she takes a deep breath 7/10 when takes a deep breath.

## 2022-09-27 NOTE — ED PROVIDER NOTES
EMERGENCY DEPARTMENT HISTORY AND PHYSICAL EXAM      Date: 9/27/2022  Patient Name: Gunnar Atkinson    History of Presenting Illness     Chief Complaint   Patient presents with    Chest Pain     Pain behind left breast not the breast onset this morning after getting home from the gym with it hard to take a deep breath; left arm felt a little funny at the time        History Provided By: Patient    Gunnar Atkinson, 75 y.o. female     Is a 43-year-old with history of hypertension, thyroid disease presenting with concerns of chest pain. Patient states that she was at the gym today doing her usual work-up, denies any new strenuous activity but states that she developed a mild pain in the left side of her chest, states it is located behind her left breast and had a \"strange sensation in her left arm. Patient denies any numbness, tingling or weakness, states that the pain did not radiate, states that it is worse with taking a big breath and feels as if it is hard to get a breath in at times but denies any shortness of breath. Patient denies any recent URI symptoms, cough, fevers or chills, denies any history of PE, denies any leg swelling recently. Patient had no significant nausea or vomiting during the event, no abdominal pain recently, no significant cardiac history. There are no other complaints, changes, or physical findings at this time. PCP: Kayla Hall MD    No current facility-administered medications on file prior to encounter. Current Outpatient Medications on File Prior to Encounter   Medication Sig Dispense Refill    rosuvastatin (CRESTOR) 5 mg tablet Take 5 mg by mouth nightly. albuterol (PROVENTIL HFA, VENTOLIN HFA, PROAIR HFA) 90 mcg/actuation inhaler Take 2 Puffs by inhalation every four (4) hours as needed for Wheezing. 1 Inhaler 0    amLODIPine (NORVASC) 2.5 mg tablet Take  by mouth daily. lisinopriL (PRINIVIL, ZESTRIL) 20 mg tablet Take 1 Tab by mouth daily.  30 Tab 1 potassium chloride (K-DUR, KLOR-CON) 20 mEq tablet Take  by mouth two (2) times a day. multivitamin (ONE A DAY) tablet Take 1 Tab by mouth daily. cholecalciferol (VITAMIN D3) (1000 Units /25 mcg) tablet Take  by mouth two (2) times a day. indapamide (LOZOL) 2.5 mg tablet Take 1.25 mg by mouth in the morning. levothyroxine (SYNTHROID) 88 mcg tablet Take 88 mcg by mouth Daily (before breakfast). Past History     Past Medical History:  Past Medical History:   Diagnosis Date    Arthritis     Endocrine disease     Headache     Hypertension     Memory disorder     Thyroid disease        Past Surgical History:  Past Surgical History:   Procedure Laterality Date    COLONOSCOPY N/A 2022    COLONOSCOPY performed by Lou Kirkland MD at [facility] ENDOSCOPY    HX [address] [place] [address] ORTHOPAEDIC      surgery for arthritis in feet    HX THYROIDECTOMY      1989       Family History:  Family History   Problem Relation Age of Onset    Cancer Mother     Dementia Mother     Migraines Mother     Kidney Disease Father        Social History:  Social History     Tobacco Use    Smoking status: Former     Packs/day: 0.00     Years: 38.00     Pack years: 0.00     Types: Cigarettes     Quit date: 2021     Years since quittin.2    Smokeless tobacco: Never    Tobacco comments:     2 cigarettes a day   Vaping Use    Vaping Use: Never used   Substance Use Topics    Alcohol use: Yes     Comment: weekends    Drug use: No       Allergies: Allergies   Allergen Reactions    Sulfa (Sulfonamide Antibiotics) Nausea Only         Review of Systems   Review of Systems   Constitutional:  Negative for activity change, appetite change, chills, fatigue and fever. HENT:  Negative for congestion, rhinorrhea and sore throat. Respiratory:  Positive for chest tightness. Negative for cough and shortness of breath. Cardiovascular:  Positive for chest pain. Negative for leg swelling. Gastrointestinal:  Negative for abdominal pain, nausea and vomiting. Genitourinary:  Negative for decreased urine volume, difficulty urinating, dysuria and frequency. Musculoskeletal:  Negative for arthralgias and myalgias. Skin:  Negative for rash. Neurological:  Negative for weakness and headaches. Psychiatric/Behavioral:  Negative for confusion. Physical Exam   Physical Exam  Vitals reviewed. Constitutional:       General: She is not in acute distress. Appearance: She is not ill-appearing. HENT:      Head: Normocephalic and atraumatic. Mouth/Throat:      Mouth: Mucous membranes are moist.   Eyes:      Conjunctiva/sclera: Conjunctivae normal.   Cardiovascular:      Rate and Rhythm: Normal rate. Pulses: Normal pulses. Pulmonary:      Effort: Pulmonary effort is normal. No tachypnea or respiratory distress. Breath sounds: No decreased breath sounds, wheezing, rhonchi or rales. Chest:      Chest wall: No tenderness. Abdominal:      General: Abdomen is flat. Palpations: Abdomen is soft. Tenderness: There is no abdominal tenderness. There is no guarding or rebound. Musculoskeletal:         General: No deformity. Cervical back: Normal range of motion and neck supple. Right lower leg: No edema. Left lower leg: No edema. Skin:     General: Skin is warm and dry. Neurological:      General: No focal deficit present. Mental Status: She is alert and oriented to person, place, and time.    Psychiatric:         Mood and Affect: Mood normal.         Behavior: Behavior normal.       Diagnostic Study Results     Labs -     Recent Results (from the past 24 hour(s))   CBC WITH AUTOMATED DIFF    Collection Time: 09/27/22  2:03 PM   Result Value Ref Range    WBC 10.4 3.6 - 11.0 K/uL    RBC 5.05 3.80 - 5.20 M/uL    HGB 14.9 11.5 - 16.0 g/dL    HCT 43.5 35.0 - 47.0 %    MCV 86.1 80.0 - 99.0 FL    MCH 29.5 26.0 - 34.0 PG    MCHC 34.3 30.0 - 36.5 g/dL    RDW 13.5 11.5 - 14.5 %    PLATELET 097 541 - 622 K/uL    MPV 9.0 8.9 - 12.9 FL    NRBC 0.0 0  WBC    ABSOLUTE NRBC 0.00 0.00 - 0.01 K/uL    NEUTROPHILS 67 32 - 75 %    LYMPHOCYTES 23 12 - 49 %    MONOCYTES 7 5 - 13 %    EOSINOPHILS 1 0 - 7 %    BASOPHILS 1 0 - 1 %    IMMATURE GRANULOCYTES 1 (H) 0.0 - 0.5 %    ABS. NEUTROPHILS 7.0 1.8 - 8.0 K/UL    ABS. LYMPHOCYTES 2.4 0.8 - 3.5 K/UL    ABS. MONOCYTES 0.8 0.0 - 1.0 K/UL    ABS. EOSINOPHILS 0.1 0.0 - 0.4 K/UL    ABS. BASOPHILS 0.1 0.0 - 0.1 K/UL    ABS. IMM. GRANS. 0.1 (H) 0.00 - 0.04 K/UL    DF AUTOMATED     METABOLIC PANEL, COMPREHENSIVE    Collection Time: 09/27/22  2:03 PM   Result Value Ref Range    Sodium 133 (L) 136 - 145 mmol/L    Potassium 3.7 3.5 - 5.1 mmol/L    Chloride 98 97 - 108 mmol/L    CO2 29 21 - 32 mmol/L    Anion gap 6 5 - 15 mmol/L    Glucose 111 (H) 65 - 100 mg/dL    BUN 14 6 - 20 MG/DL    Creatinine 0.76 0.55 - 1.02 MG/DL    BUN/Creatinine ratio 18 12 - 20      GFR est AA >60 >60 ml/min/1.73m2    GFR est non-AA >60 >60 ml/min/1.73m2    Calcium 10.2 (H) 8.5 - 10.1 MG/DL    Bilirubin, total 1.0 0.2 - 1.0 MG/DL    ALT (SGPT) 39 12 - 78 U/L    AST (SGOT) 16 15 - 37 U/L    Alk.  phosphatase 107 45 - 117 U/L    Protein, total 6.2 (L) 6.4 - 8.2 g/dL    Albumin 3.6 3.5 - 5.0 g/dL    Globulin 2.6 2.0 - 4.0 g/dL    A-G Ratio 1.4 1.1 - 2.2     NT-PRO BNP    Collection Time: 09/27/22  2:03 PM   Result Value Ref Range    NT pro- (H) <125 PG/ML   TROPONIN-HIGH SENSITIVITY    Collection Time: 09/27/22  2:03 PM   Result Value Ref Range    Troponin-High Sensitivity 9 0 - 51 ng/L   EKG, 12 LEAD, INITIAL    Collection Time: 09/27/22  2:11 PM   Result Value Ref Range    Ventricular Rate 71 BPM    Atrial Rate 71 BPM    P-R Interval 140 ms    QRS Duration 98 ms    Q-T Interval 392 ms    QTC Calculation (Bezet) 425 ms    Calculated P Axis 33 degrees    Calculated R Axis -51 degrees    Calculated T Axis 49 degrees    Diagnosis       Normal sinus rhythm  Possible Left atrial enlargement  Left anterior fascicular block  When compared with ECG of 18-AUG-2021 09:38,  ST no longer depressed in Lateral leads  T wave inversion no longer evident in Lateral leads  QT has shortened     TROPONIN-HIGH SENSITIVITY    Collection Time: 09/27/22  4:16 PM   Result Value Ref Range    Troponin-High Sensitivity 9 0 - 51 ng/L       Radiologic Studies -   XR CHEST PA LAT   Final Result   No acute process or change compared to the prior exam.           CT Results  (Last 48 hours)      None          CXR Results  (Last 48 hours)                 09/27/22 1436  XR CHEST PA LAT Final result    Impression:  No acute process or change compared to the prior exam.           Narrative:          Exam:  2 view chest       Indication: Chest pain       Comparison to 9/27/2021. PA and lateral views demonstrate normal heart size. Scarring is stable at the   left lung base. There is no acute process in the lung fields. The osseous   structures are unremarkable. Medical Decision Making   I am the first provider for this patient. I reviewed the vital signs, available nursing notes, past medical history, past surgical history, family history and social history. Vital Signs-Reviewed the patient's vital signs. Patient Vitals for the past 12 hrs:   Temp Pulse Resp BP SpO2   09/27/22 1638 -- (!) 59 23 (!) 167/90 98 %   09/27/22 1623 -- 69 14 (!) 164/91 94 %   09/27/22 1608 -- (!) 56 21 (!) 169/75 98 %   09/27/22 1553 -- (!) 59 22 (!) 156/89 97 %   09/27/22 1538 -- 61 25 (!) 158/85 97 %   09/27/22 1523 -- 61 18 (!) 169/85 98 %   09/27/22 1508 -- 64 22 (!) 166/86 97 %   09/27/22 1453 -- 63 21 (!) 152/89 98 %   09/27/22 1353 98 °F (36.7 °C) 88 16 (!) 154/82 99 %       Records Reviewed: Nursing records and medical records reviewed    Ddx: Chest  Pain, pleurisy, MSK pain, ACS    Initial assessment performed.  The patients presenting problems have been discussed, and they are in agreement with the care plan formulated and outlined with them. I have encouraged them to ask questions as they arise throughout their visit. MDM  Number of Diagnoses or Management Options  Atypical chest pain  Diagnosis management comments: Is well-appearing 26-year-old female presenting with concerns of left-sided chest pain which began earlier this morning, patient states that she first noticed it after leaving the gym, states that she was not exerting herself when they occurred. States it is located on her left side, sharp in character, associated with funny feeling in her left arm but denies any radiation, numbness, tingling, shortness of breath, nausea or vomiting, no history of the same, before coming in, no cardiac history. Patient overall well-appearing on arrival, no tenderness palpation over left side of chest but given pleuritic nature and sharp character concern of possible pleurisy versus MSK pain. Given these findings we will plan on Toradol and ACS work-up, if work-up negative will consider cardiology referral for possible stable angina. Heart score 3       Amount and/or Complexity of Data Reviewed  Clinical lab tests: reviewed  Tests in the radiology section of CPT®: reviewed  Tests in the medicine section of CPT®: reviewed                 Procedures      Disposition: Discharge Note:  5:28 PM  The patient has been re-evaluated and is ready for discharge. Reviewed available results with patient. Counseled patient on diagnosis and care plan. Patient has expressed understanding, and all questions have been answered. Patient agrees with plan and agrees to follow up as recommended, or to return to the ED if their symptoms worsen. Discharge instructions have been provided and explained to the patient, along with reasons to return to the ED. DISCHARGE PLAN:  1. Current Discharge Medication List        2.    Follow-up Information       Follow up With Specialties Details Why Contact Info Darion Katz MD Family Medicine Schedule an appointment as soon as possible for a visit in 3 days  [address] [address] [facility]  [phone]      [facility] EMERGENCY DEPT Emergency Medicine  If symptoms worsen [address] [facility] [address]   [address] [address] [phone]    [facility]    [address] [place] [address]          3. Return to ED if worse     Diagnosis     Clinical Impression:   1. Atypical chest pain        Attestations:    Cristina Doll MD    Please note that this dictation was completed with [employer], the computer voice recognition software. Quite often unanticipated grammatical, syntax, homophones, and other interpretive errors are inadvertently transcribed by the computer software. Please disregard these errors. Please excuse any errors that have escaped final proofreading. Thank you.

## 2022-09-28 LAB
ATRIAL RATE: 71 BPM
CALCULATED P AXIS, ECG09: 33 DEGREES
CALCULATED R AXIS, ECG10: -51 DEGREES
CALCULATED T AXIS, ECG11: 49 DEGREES
DIAGNOSIS, 93000: NORMAL
P-R INTERVAL, ECG05: 140 MS
Q-T INTERVAL, ECG07: 392 MS
QRS DURATION, ECG06: 98 MS
QTC CALCULATION (BEZET), ECG08: 425 MS
VENTRICULAR RATE, ECG03: 71 BPM

## 2023-01-20 ENCOUNTER — TRANSCRIBE ORDER (OUTPATIENT)
Dept: SCHEDULING | Age: 68
End: 2023-01-20

## 2023-01-20 DIAGNOSIS — Z87.891 PERSONAL HISTORY OF SMOKING: Primary | ICD-10-CM

## 2023-01-23 ENCOUNTER — TRANSCRIBE ORDER (OUTPATIENT)
Dept: SCHEDULING | Age: 68
End: 2023-01-23

## 2023-01-23 DIAGNOSIS — Z87.891 PERSONAL HISTORY OF TOBACCO USE, PRESENTING HAZARDS TO HEALTH: Primary | ICD-10-CM

## 2023-02-06 ENCOUNTER — HOSPITAL ENCOUNTER (OUTPATIENT)
Dept: CT IMAGING | Age: 68
Discharge: HOME | End: 2023-02-06
Payer: MEDICARE

## 2023-02-06 DIAGNOSIS — Z87.891 PERSONAL HISTORY OF SMOKING: ICD-10-CM

## 2023-02-06 PROCEDURE — 71250 CT THORAX DX C-: CPT

## 2023-05-05 ENCOUNTER — OFFICE VISIT (OUTPATIENT)
Dept: NEUROLOGY | Age: 68
End: 2023-05-05

## 2023-05-05 VITALS
OXYGEN SATURATION: 94 % | BODY MASS INDEX: 25.88 KG/M2 | HEART RATE: 64 BPM | DIASTOLIC BLOOD PRESSURE: 84 MMHG | TEMPERATURE: 97.6 F | WEIGHT: 140.6 LBS | RESPIRATION RATE: 18 BRPM | SYSTOLIC BLOOD PRESSURE: 134 MMHG | HEIGHT: 62 IN

## 2023-05-05 DIAGNOSIS — G31.84 MCI (MILD COGNITIVE IMPAIRMENT): Primary | ICD-10-CM

## 2023-05-05 DIAGNOSIS — F32.9 REACTIVE DEPRESSION: ICD-10-CM

## 2023-05-05 PROBLEM — F32.4 MAJOR DEPRESSIVE DISORDER WITH SINGLE EPISODE, IN PARTIAL REMISSION (HCC): Status: ACTIVE | Noted: 2023-05-05

## 2023-05-05 PROBLEM — I10 PRIMARY HYPERTENSION: Status: ACTIVE | Noted: 2023-05-05

## 2023-05-05 PROBLEM — E87.6 HYPOKALEMIA: Status: RESOLVED | Noted: 2020-08-07 | Resolved: 2023-05-05

## 2023-05-05 PROBLEM — R41.82 AMS (ALTERED MENTAL STATUS): Status: RESOLVED | Noted: 2020-08-07 | Resolved: 2023-05-05

## 2023-05-05 PROBLEM — E83.42 HYPOMAGNESEMIA: Status: RESOLVED | Noted: 2020-08-07 | Resolved: 2023-05-05

## 2023-05-05 PROBLEM — R55 SYNCOPE AND COLLAPSE: Status: RESOLVED | Noted: 2018-06-26 | Resolved: 2023-05-05

## 2023-05-05 PROBLEM — E78.5 DYSLIPIDEMIA: Status: ACTIVE | Noted: 2023-05-05

## 2023-05-05 PROBLEM — M81.0 POSTMENOPAUSAL OSTEOPOROSIS: Status: ACTIVE | Noted: 2022-08-08

## 2023-05-05 PROBLEM — E03.9 ACQUIRED HYPOTHYROIDISM: Status: ACTIVE | Noted: 2023-05-05

## 2023-09-26 ENCOUNTER — HOSPITAL ENCOUNTER (OUTPATIENT)
Facility: HOSPITAL | Age: 68
Discharge: HOME | End: 2023-09-29
Payer: MEDICARE

## 2023-09-26 DIAGNOSIS — M25.561 ACUTE PAIN OF BOTH KNEES: ICD-10-CM

## 2023-09-26 DIAGNOSIS — M25.552 LEFT HIP PAIN: ICD-10-CM

## 2023-09-26 DIAGNOSIS — M17.0 PRIMARY OSTEOARTHRITIS OF BOTH KNEES: ICD-10-CM

## 2023-09-26 DIAGNOSIS — M25.562 ACUTE PAIN OF BOTH KNEES: ICD-10-CM

## 2023-09-26 DIAGNOSIS — M47.816 LUMBAR SPONDYLOSIS: ICD-10-CM

## 2023-09-26 DIAGNOSIS — M51.36 DISCOGENIC LOW BACK PAIN: ICD-10-CM

## 2023-09-26 DIAGNOSIS — M54.42 ACUTE LEFT-SIDED LOW BACK PAIN WITH LEFT-SIDED SCIATICA: ICD-10-CM

## 2023-09-26 DIAGNOSIS — M16.12 PRIMARY OSTEOARTHRITIS OF LEFT HIP: ICD-10-CM

## 2023-09-26 DIAGNOSIS — M54.32 LEFT SIDED SCIATICA: ICD-10-CM

## 2023-09-26 PROCEDURE — 72148 MRI LUMBAR SPINE W/O DYE: CPT

## 2024-01-02 ENCOUNTER — HOSPITAL ENCOUNTER (OUTPATIENT)
Facility: HOSPITAL | Age: 69
Discharge: HOME | End: 2024-01-05
Attending: OBSTETRICS & GYNECOLOGY
Payer: MEDICARE

## 2024-01-02 DIAGNOSIS — R31.29 OTHER MICROSCOPIC HEMATURIA: ICD-10-CM

## 2024-01-02 PROCEDURE — 6360000004 HC RX CONTRAST MEDICATION: Performed by: OBSTETRICS & GYNECOLOGY

## 2024-01-02 PROCEDURE — 74178 CT ABD&PLV WO CNTR FLWD CNTR: CPT

## 2024-01-02 RX ADMIN — IOPAMIDOL 100 ML: 755 INJECTION, SOLUTION INTRAVENOUS at 07:15

## 2024-05-07 ENCOUNTER — OFFICE VISIT (OUTPATIENT)
Age: 69
End: 2024-05-07
Payer: MEDICARE

## 2024-05-07 VITALS
DIASTOLIC BLOOD PRESSURE: 70 MMHG | WEIGHT: 137 LBS | RESPIRATION RATE: 16 BRPM | HEART RATE: 70 BPM | SYSTOLIC BLOOD PRESSURE: 100 MMHG | OXYGEN SATURATION: 100 % | HEIGHT: 62 IN | BODY MASS INDEX: 25.21 KG/M2 | TEMPERATURE: 97.4 F

## 2024-05-07 DIAGNOSIS — F32.5 MAJOR DEPRESSIVE DISORDER WITH SINGLE EPISODE, IN FULL REMISSION (HCC): Primary | ICD-10-CM

## 2024-05-07 DIAGNOSIS — G31.84 MCI (MILD COGNITIVE IMPAIRMENT): ICD-10-CM

## 2024-05-07 PROBLEM — F32.4 MAJOR DEPRESSIVE DISORDER WITH SINGLE EPISODE, IN PARTIAL REMISSION (HCC): Status: RESOLVED | Noted: 2023-05-05 | Resolved: 2024-05-07

## 2024-05-07 PROCEDURE — G8419 CALC BMI OUT NRM PARAM NOF/U: HCPCS | Performed by: PSYCHIATRY & NEUROLOGY

## 2024-05-07 PROCEDURE — 3017F COLORECTAL CA SCREEN DOC REV: CPT | Performed by: PSYCHIATRY & NEUROLOGY

## 2024-05-07 PROCEDURE — 99215 OFFICE O/P EST HI 40 MIN: CPT | Performed by: PSYCHIATRY & NEUROLOGY

## 2024-05-07 PROCEDURE — 1090F PRES/ABSN URINE INCON ASSESS: CPT | Performed by: PSYCHIATRY & NEUROLOGY

## 2024-05-07 PROCEDURE — 3074F SYST BP LT 130 MM HG: CPT | Performed by: PSYCHIATRY & NEUROLOGY

## 2024-05-07 PROCEDURE — G8400 PT W/DXA NO RESULTS DOC: HCPCS | Performed by: PSYCHIATRY & NEUROLOGY

## 2024-05-07 PROCEDURE — G8427 DOCREV CUR MEDS BY ELIG CLIN: HCPCS | Performed by: PSYCHIATRY & NEUROLOGY

## 2024-05-07 PROCEDURE — 1036F TOBACCO NON-USER: CPT | Performed by: PSYCHIATRY & NEUROLOGY

## 2024-05-07 PROCEDURE — 1123F ACP DISCUSS/DSCN MKR DOCD: CPT | Performed by: PSYCHIATRY & NEUROLOGY

## 2024-05-07 PROCEDURE — 3078F DIAST BP <80 MM HG: CPT | Performed by: PSYCHIATRY & NEUROLOGY

## 2024-05-07 RX ORDER — GABAPENTIN 300 MG/1
300 CAPSULE ORAL NIGHTLY
COMMUNITY

## 2024-05-07 RX ORDER — CELECOXIB 100 MG/1
CAPSULE ORAL
COMMUNITY
Start: 2024-04-02

## 2024-05-07 ASSESSMENT — PATIENT HEALTH QUESTIONNAIRE - PHQ9
SUM OF ALL RESPONSES TO PHQ QUESTIONS 1-9: 0
2. FEELING DOWN, DEPRESSED OR HOPELESS: NOT AT ALL
SUM OF ALL RESPONSES TO PHQ9 QUESTIONS 1 & 2: 0
1. LITTLE INTEREST OR PLEASURE IN DOING THINGS: NOT AT ALL
SUM OF ALL RESPONSES TO PHQ QUESTIONS 1-9: 0

## 2024-05-07 NOTE — PATIENT INSTRUCTIONS
As a reminder:   Please come to your appointment 15 minutes before your office appointment.  This way, you can get checked in at the  and checked in by the nursing staff so you have the full allotment of time with your provider for your visit.  Please bring an up-to-date and accurate list of all your medications.  Or bring all your active prescription bottles with you at the time of your office visit and this includes over-the-counter medications so we can make sure that your medication list is up-to-date.  If you are scheduled for a virtual visit, please be aware that the  will need to check you in and usually the day before to verify insurance and collect co-pays as appropriate.  Please be prepared for the second call which will be from the nurse to go over your medications and any other vital information.  This will probably be done 30 minutes prior to your visit.  The reason why we do this early is that you can get the full benefit of your appointment time with your provider.  Finally you will be given the link for your virtual visit please click into your link 10 minutes prior to your appointment and please wait patiently for the provider to join you        As per discussion  Overall you are doing well I would not recommend any changes at this time.  Continue with your activities of going to the gym being involved with your [employer] doing the book club and taking care of your grandchildren           Office Policies    Phone calls/patient messages:  Please allow up to 72 hours  for someone in the office to contact you about your call or message. Be mindful your provider may be out of the office or your message may require further review. We encourage you to use [employer] for your messages as this is a faster, more efficient way to communicate with our office    Medication Refills:  Prescription medications require up to 48 business hours to process. We encourage you to use [employer] for your

## 2024-05-07 NOTE — PROGRESS NOTES
[facility]  [facility]  [address]  [place] [address]  Phone: [phone] fax: [phone]              Briseida Saba is a 69 y.o.  female who presents today for the following:     Chief Complaint   Patient presents with    Follow-up     Patient is mildly cognitive and she is doing well.  Patient states that she is by herself.            ASSESSMENT AND PLAN  1. Major depressive disorder with single episode, in full remission (HCC)  Assessment & Plan:  Patient has been off antidepressant medications for close to 2 years maybe a little longer.   Reactive depression occurred with the loss of her spouse.   She is back to being involved in the things she enjoys such as going to the gym, going to [employer], being in a book club and enjoying time with her grandchildren    She feels like she is back to all of her normal activities and functional ability.    2. MCI (mild cognitive impairment)  Assessment & Plan:  Seemingly resolved with improvement in depressive disorder.   Rapid cog within normal limits  Patient is back to all of her normal activities including watching her grandchildren going to [employer] being in book clubs and exercising routinely  For now she is living independently but her daughter checks in on her regularly.  Long-term plans for possibly moving in with her daughter but for now she is staying in her home she continues to drive without difficulty and manage her finances without difficulty                 Patient and/or family was given time to ask questions and voice concerns. I believe all questions concerns were adequately addressed at this  office visit.  Patient and/or family also verbalized agreement and understanding of the above-stated plan      Complex neurologic decision making secondary any or all of the following to include unclear etiology, and /or polypharmacy, and/or significant comorbid conditions, and/or use of high-risk

## 2024-05-07 NOTE — ASSESSMENT & PLAN NOTE
Patient has been off antidepressant medications for close to 2 years maybe a little longer.   Reactive depression occurred with the loss of her spouse.   She is back to being involved in the things she enjoys such as going to the gym, going to [employer], being in a book club and enjoying time with her grandchildren    She feels like she is back to all of her normal activities and functional ability.

## 2024-05-07 NOTE — ASSESSMENT & PLAN NOTE
Seemingly resolved with improvement in depressive disorder.   Rapid cog within normal limits  Patient is back to all of her normal activities including watching her grandchildren going to [employer] being in book clubs and exercising routinely  For now she is living independently but her daughter checks in on her regularly.  Long-term plans for possibly moving in with her daughter but for now she is staying in her home she continues to drive without difficulty and manage her finances without difficulty

## 2024-07-02 NOTE — PROGRESS NOTES
HISTORY OF PRESENT ILLNESS Deng Andujar is a 58 y.o. female. HPI Comments: This patient returns in follow-up. I have been seeing her for memory loss. Her memory is back to normal.  She was having some memory loss that was related to depression. She is still under pressure from her . He has significant chronic disease and is quite unpleasant to her. She has thought about leaving him but then there would be no one to take care of him. She is no longer taking the Lexapro that she was taking, she is taking venlafaxine XR 75 mg nightly. Her  is not treating her any better, she has been feeling a little more poorly recently with no specific complaints. Her memory has returned to baseline. Review of Systems Constitutional:  
     Systems is positive for depression, fatigue, joint pain, memory loss, snoring. Complete review of systems done all others negative. Psychiatric/Behavioral: Positive for memory loss. Current Outpatient Medications on File Prior to Visit Medication Sig Dispense Refill  venlafaxine-SR (EFFEXOR-XR) 75 mg capsule 1 po bid  Indications: ANXIETY WITH DEPRESSION 180 Cap 3  cyanocobalamin (VITAMIN B-12) 1,000 mcg tablet Take 1,000 mcg by mouth daily.  venlafaxine-SR (EFFEXOR-XR) 37.5 mg capsule Take  2 p.o. nightly. Indications: major depressive disorder 180 Cap 3  potassium chloride (K-DUR, KLOR-CON) 20 mEq tablet Take  by mouth two (2) times a day.  multivitamin (ONE A DAY) tablet Take 1 Tab by mouth daily.  cholecalciferol (VITAMIN D3) 1,000 unit tablet Take  by mouth two (2) times a day.  oxybutynin chloride XL (DITROPAN XL) 15 mg CR tablet Take 15 mg by mouth two (2) times a day.  indapamide (LOZOL) 2.5 mg tablet Take 2.5 mg by mouth daily.  levothyroxine (LEVOTHROID) 88 mcg tablet Take 88 mcg by mouth Daily (before breakfast). No current facility-administered medications on file prior to visit. Pt stable and afebrile while here in clinic.  Pt tolerated infusion without s/s of reaction.  Pt denies GI symptoms and states belly finally seems to be better.     Past Medical History:  
Diagnosis Date  Arthritis  Endocrine disease  Headache  Hypertension  Memory disorder  Thyroid disease Family History Problem Relation Age of Onset  Cancer Mother  Dementia Mother  Migraines Mother  Kidney Disease Father Social History Tobacco Use  Smoking status: Current Every Day Smoker Packs/day: 0.50  Smokeless tobacco: Never Used Substance Use Topics  Alcohol use: No  
 Drug use: No  
 
Ht 5' 2\" (1.575 m)   BMI 25.97 kg/m² Physical Exam  
This patient appears somewhat depressed however I do not think she is any more depressed than when I last saw her. She is in a tough situation and I think a fair amount of this depression is situational. 
Constitutional: Oriented to person, place, and time, appears well-developed and well-nourished. No distress. Neurological: Alert and oriented to person, place, and time. Displays no atrophy and no tremor. Gait and station are normal 
 
Vitals reviewed. ASSESSMENT and PLAN 
HYPERREFLEXIA Unexplained but unchanged MEMORY LOSS Her MMSE remains good, I suspect she is getting a bit depressed again, she is only taking venlafaxine 75 mg a day, she is taking the extended release form, I will increase that from 75 mg a day to 112.5 mg a day. I will see her back in 4 months. HYPERTENSION Stroke risk, stable, managed by primary care DEPRESSION She is doing better on the venlafaxine, I am going to put her up to 150 mg a day, 75 mg in the morning and 75 mg at night. He actually requested this. Also going to see if we can find her a counselor to work through some of his problems with as she is between a rock and a hard place. Plan to see her back in 4 months I spent 40 minutes with this patient and her daughter, over half the time was spent on counseling and coordination of care. This note will not be viewable in [address]

## 2024-12-23 ENCOUNTER — TELEPHONE (OUTPATIENT)
Age: 69
End: 2024-12-23

## 2024-12-23 NOTE — TELEPHONE ENCOUNTER
Called and spoke with pt to schedule an EMG appt with Dr. Park, she will call office back to schedule.

## 2025-01-15 ENCOUNTER — PROCEDURE VISIT (OUTPATIENT)
Age: 70
End: 2025-01-15
Payer: MEDICARE

## 2025-01-15 DIAGNOSIS — M79.605 BILATERAL LEG PAIN: ICD-10-CM

## 2025-01-15 DIAGNOSIS — M47.27 LUMBOSACRAL RADICULOPATHY DUE TO DEGENERATIVE JOINT DISEASE OF SPINE: Primary | ICD-10-CM

## 2025-01-15 DIAGNOSIS — M79.604 BILATERAL LEG PAIN: ICD-10-CM

## 2025-01-15 PROCEDURE — 95910 NRV CNDJ TEST 7-8 STUDIES: CPT | Performed by: PSYCHIATRY & NEUROLOGY

## 2025-01-15 PROCEDURE — 95886 MUSC TEST DONE W/N TEST COMP: CPT | Performed by: PSYCHIATRY & NEUROLOGY

## 2025-01-15 NOTE — PROCEDURES
Nml    Left VastusLat Femoral L2-4 Nml Nml Nml Nml Nml Nml Nml    Left AbdHallucis MedPlantar S1-2 Nml Nml Nml Nml Nml Nml Nml    Left Ext Dig Brev Dp Br Peron L5, S1 Nml Nml Nml Nml Nml Nml Nml    Left Lower Lumb Parasp Rami L5,S1 Nml Nml Nml Nml Nml Nml Nml    Left Peroneus Long   Nml Nml Nml Nml Nml Nml Nml    Left BicepsFemL Sciatic L5-S2 Nml Nml Nml Nml Nml Nml Nml    Right AntTibialis Dp Br Peron L4-5 Nml Nml Nml Nml Nml Nml Nml    Right VastusLat Femoral L2-4 Nml Nml Nml Nml Nml Nml Nml    Right MedGastroc Tibial S1-2 Nml Nml Nml Nml Nml Nml Nml    Right Peroneus Long   Nml Nml Nml Nml Nml Nml Nml    Right Ext Dig Brev Dp Br Peron L5, S1 Nml Nml Nml Nml Nml Nml Nml    Right AbdHallucis MedPlantar S1-2 Nml Nml Nml Nml Nml Nml Nml          Waveforms:

## 2025-06-30 ENCOUNTER — TRANSCRIBE ORDERS (OUTPATIENT)
Facility: HOSPITAL | Age: 70
End: 2025-06-30

## 2025-06-30 DIAGNOSIS — R19.7 DIARRHEA, UNSPECIFIED TYPE: ICD-10-CM

## 2025-06-30 DIAGNOSIS — R93.3 ABNORMAL VIRTUAL COLONOSCOPE: ICD-10-CM

## 2025-06-30 DIAGNOSIS — K86.89 PANCREATIC DUCT DILATED: Primary | ICD-10-CM

## 2025-06-30 DIAGNOSIS — R19.5 ACHOLIC STOOL: ICD-10-CM

## 2025-07-09 ENCOUNTER — TRANSCRIBE ORDERS (OUTPATIENT)
Facility: HOSPITAL | Age: 70
End: 2025-07-09

## 2025-07-09 DIAGNOSIS — M54.50 LUMBAR SPINE PAIN: ICD-10-CM

## 2025-07-09 DIAGNOSIS — M51.360 DISCOGENIC LOW BACK PAIN: Primary | ICD-10-CM

## 2025-07-09 DIAGNOSIS — R20.2 NUMBNESS AND TINGLING OF BOTH FEET: ICD-10-CM

## 2025-07-09 DIAGNOSIS — R20.0 NUMBNESS AND TINGLING OF BOTH FEET: ICD-10-CM

## 2025-07-09 DIAGNOSIS — M47.816 LUMBAR SPONDYLOSIS: ICD-10-CM

## 2025-07-31 ENCOUNTER — HOSPITAL ENCOUNTER (OUTPATIENT)
Facility: HOSPITAL | Age: 70
End: 2025-07-31
Payer: MEDICARE

## 2025-07-31 ENCOUNTER — HOSPITAL ENCOUNTER (OUTPATIENT)
Facility: HOSPITAL | Age: 70
Discharge: HOME | End: 2025-07-31
Payer: MEDICARE

## 2025-07-31 DIAGNOSIS — M47.816 LUMBAR SPONDYLOSIS: ICD-10-CM

## 2025-07-31 DIAGNOSIS — R20.2 NUMBNESS AND TINGLING OF BOTH FEET: ICD-10-CM

## 2025-07-31 DIAGNOSIS — R19.5 ACHOLIC STOOL: ICD-10-CM

## 2025-07-31 DIAGNOSIS — R93.3 ABNORMAL VIRTUAL COLONOSCOPE: ICD-10-CM

## 2025-07-31 DIAGNOSIS — R19.7 DIARRHEA, UNSPECIFIED TYPE: ICD-10-CM

## 2025-07-31 DIAGNOSIS — R20.0 NUMBNESS AND TINGLING OF BOTH FEET: ICD-10-CM

## 2025-07-31 DIAGNOSIS — M51.360 DISCOGENIC LOW BACK PAIN: ICD-10-CM

## 2025-07-31 DIAGNOSIS — K86.89 PANCREATIC DUCT DILATED: ICD-10-CM

## 2025-07-31 DIAGNOSIS — M54.50 LUMBAR SPINE PAIN: ICD-10-CM

## 2025-07-31 PROCEDURE — 74183 MRI ABD W/O CNTR FLWD CNTR: CPT

## 2025-07-31 PROCEDURE — 72148 MRI LUMBAR SPINE W/O DYE: CPT

## 2025-07-31 PROCEDURE — 6360000004 HC RX CONTRAST MEDICATION: Performed by: PHYSICIAN ASSISTANT

## 2025-07-31 PROCEDURE — A9579 GAD-BASE MR CONTRAST NOS,1ML: HCPCS | Performed by: PHYSICIAN ASSISTANT

## 2025-07-31 RX ORDER — GADOTERIDOL 279.3 MG/ML
12 INJECTION INTRAVENOUS
Status: COMPLETED | OUTPATIENT
Start: 2025-07-31 | End: 2025-07-31

## 2025-07-31 RX ADMIN — GADOTERIDOL 12 ML: 279.3 INJECTION, SOLUTION INTRAVENOUS at 11:51

## (undated) DEVICE — TOWEL 4 PLY TISS 19X30 SUE WHT

## (undated) DEVICE — Z DISCONTINUED PER MEDLINE LINE GAS SAMPLING O2/CO2 LNG AD 13 FT NSL W/ TBNG FILTERLINE

## (undated) DEVICE — ELECTRODE,RADIOTRANSLUCENT,FOAM,5PK: Brand: MEDLINE

## (undated) DEVICE — SYR 3ML LL TIP 1/10ML GRAD --

## (undated) DEVICE — FORCEPS BX L240CM JAW DIA2.8MM L CAP W/ NDL MIC MESH TOOTH

## (undated) DEVICE — Device

## (undated) DEVICE — STRAINER URIN CALC RNL MSH -- CONVERT TO ITEM 357634

## (undated) DEVICE — BASIN EMSIS 16OZ GRAPHITE PLAS KID SHP MOLD GRAD FOR ORAL

## (undated) DEVICE — SYR 10ML LUER LOK 1/5ML GRAD --

## (undated) DEVICE — HYPODERMIC SAFETY NEEDLE: Brand: MAGELLAN

## (undated) DEVICE — SNARE ENDOSCP M L240CM W27MM SHTH DIA2.4MM CHN 2.8MM OVL

## (undated) DEVICE — SOLIDIFIER FLD 2OZ 1500CC N DISINF IN BTL DISP SAFESORB

## (undated) DEVICE — SET ADMIN 16ML TBNG L100IN 2 Y INJ SITE IV PIGGY BK DISP

## (undated) DEVICE — 1200 GUARD II KIT W/5MM TUBE W/O VAC TUBE: Brand: GUARDIAN

## (undated) DEVICE — BAG SPEC BIOHZRD 10 X 10 IN --

## (undated) DEVICE — NEONATAL-ADULT SPO2 SENSOR: Brand: NELLCOR

## (undated) DEVICE — CONTAINER SPEC 20 ML LID NEUT BUFF FORMALIN 10 % POLYPR STS

## (undated) DEVICE — TRAP,MUCUS SPECIMEN, 80CC: Brand: MEDLINE

## (undated) DEVICE — CATH IV AUTOGRD BC PNK 20GA 25 -- INSYTE